# Patient Record
Sex: FEMALE | Race: BLACK OR AFRICAN AMERICAN | Employment: OTHER | URBAN - METROPOLITAN AREA
[De-identification: names, ages, dates, MRNs, and addresses within clinical notes are randomized per-mention and may not be internally consistent; named-entity substitution may affect disease eponyms.]

---

## 2017-01-24 ENCOUNTER — THERAPY VISIT (OUTPATIENT)
Dept: PHYSICAL THERAPY | Facility: CLINIC | Age: 32
End: 2017-01-24
Payer: COMMERCIAL

## 2017-01-24 DIAGNOSIS — M17.0 OSTEOARTHRITIS OF BOTH KNEES, UNSPECIFIED OSTEOARTHRITIS TYPE: ICD-10-CM

## 2017-01-24 DIAGNOSIS — M25.562 PAIN IN BOTH KNEES, UNSPECIFIED CHRONICITY: Primary | ICD-10-CM

## 2017-01-24 DIAGNOSIS — M25.561 PAIN IN BOTH KNEES, UNSPECIFIED CHRONICITY: Primary | ICD-10-CM

## 2017-01-24 PROCEDURE — 97161 PT EVAL LOW COMPLEX 20 MIN: CPT | Mod: GP | Performed by: PHYSICAL THERAPIST

## 2017-01-24 PROCEDURE — 97110 THERAPEUTIC EXERCISES: CPT | Mod: GP | Performed by: PHYSICAL THERAPIST

## 2017-01-24 NOTE — PROGRESS NOTES
Subjective:    HPI Comments: Hx of B foot pain and B knee pain associated w/ wt    Yamilex Mosher is a 31 year old female with a bilateral knees condition.  Occurance: started taking boxing classes and get in shape women.  Condition occurred: during recreation/sport.  This is a new condition  Pain began about 3 months ago.    Patient reports pain:  Anterior.    Pain is described as aching and is intermittent Pain Scale: 2-3/10 since injections, 8/10 before injection.     Symptoms are exacerbated by activity, descending stairs, ascending stairs, bending/squatting, kneeling, standing and transfers Relieved by: cortizone injections.  Since onset symptoms are gradually improving.  Special tests:  X-ray.      General health as reported by patient is fair.  Pertinent medical history includes:  Osteoarthritis.  Medical allergies: no.  Other surgeries include:  Other.  Current medications:  None as reported by the patient.  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  Patient is working in normal job without restrictions.  Primary job tasks include:  Prolonged sitting.    Barriers include:  Stairs.    Red flags:  None as reported by the patient.                      Objective:    System                                           Hip Evaluation    Hip Strength:    Flexion:   Left: 4+/5   Pain:  Right: 4+/5   Pain:                    Extension:  Left: 4-/5  Pain:Right: 4-/5    Pain:    Abduction:  Left: 4-/5     Pain:Right: 4-/5    Pain:  Adduction:  Left: 4-/5    Pain:Right: 4-/5   Pain:  Internal Rotation:  Left: 4/5    Pain:Right: 4/5   Pain:  External Rotation:  Left: 4-/5   Pain:  Right: 4-/5   Pain:                     Knee Evaluation:  ROM:  AROM: normal    AROM    Hyperextension: Left:  4    Right:  Extension: Left:    Right:  4  Flexion: Left: 120    Right: 110        Strength:     Extension:  Left: 4/5   Pain:      Right: 4/5   Pain:  Flexion:  Left: 4-/5   Pain:      Right: 4-/5   Pain:     Quad Set Left: Fair    Pain:   Quad Set Right: Fair    Pain:  Ligament Testing:  Normal                              General     ROS    Assessment/Plan:      Patient is a 31 year old female with both sides knee complaints.    Patient has the following significant findings with corresponding treatment plan.                Diagnosis 1:  B knee OA  Pain -  manual therapy, education, directional preference exercise and home program  Decreased ROM/flexibility - manual therapy, therapeutic exercise and home program  Decreased strength - therapeutic exercise, therapeutic activities and home program  Impaired muscle performance - neuro re-education and home program  Decreased function - therapeutic activities and home program    Therapy Evaluation Codes:   1) History comprised of:   Personal factors that impact the plan of care:      None.    Comorbidity factors that impact the plan of care are:      Osteoarthritis and Overweight.     Medications impacting care: Pain.  2) Examination of Body Systems comprised of:   Body structures and functions that impact the plan of care:      Knee.   Activity limitations that impact the plan of care are:      Bathing, Bending, Driving, Dressing, Jumping, Lifting, Running, Sitting, Sports, Squatting/kneeling, Stairs, Standing and Walking.  3) Clinical presentation characteristics are:   Evolving/Changing.  4) Decision-Making    Low complexity using standardized patient assessment instrument and/or measureable assessment of functional outcome.  Cumulative Therapy Evaluation is: Low complexity.    Previous and current functional limitations:  (See Goal Flow Sheet for this information)    Short term and Long term goals: (See Goal Flow Sheet for this information)     Communication ability:  Patient appears to be able to clearly communicate and understand verbal and written communication and follow directions correctly.  Treatment Explanation - The following has been discussed with the  patient:   RX ordered/plan of care  Anticipated outcomes  Possible risks and side effects  This patient would benefit from PT intervention to resume normal activities.   Rehab potential is fair.    Frequency:  1 X week, once daily  Duration:  for 3-4 weeks tapering to 2 X a month over 4-6 weeks  Discharge Plan:  Achieve all LTG.  Independent in home treatment program.  Reach maximal therapeutic benefit.    Please refer to the daily flowsheet for treatment today, total treatment time and time spent performing 1:1 timed codes.

## 2017-01-24 NOTE — Clinical Note
[facility]  [address] [place] [address]  [phone]    2017    Re: Yamilex Mosher   :   1985  MRN:  [id number]   REFERRING PHYSICIAN:   Robyn Choudhary  [facility]  Date of Initial Evaluation:  2017  Visits:  Rxs Used: 1  Reason for Referral:   Pain in both knees, unspecified chronicity  Osteoarthritis of both knees, unspecified osteoarthritis type    EVALUATION SUMMARY    Subjective:  HPI Comments: Hx of B foot pain and B knee pain associated w/ wt  Yamilex Mosher is a 31 year old female with a bilateral knees condition.  Occurance: started taking boxing classes and get in shape women.  Condition occurred: during recreation/sport.  This is a new condition  Pain began about 3 months ago.    Patient reports pain:  Anterior.    Pain is described as aching and is intermittent Pain Scale: 2-3/10 since injections, 8/10 before injection.     Symptoms are exacerbated by activity, descending stairs, ascending stairs, bending/squatting, kneeling, standing and transfers Relieved by: cortizone injections.  Since onset symptoms are gradually improving.  Special tests:  X-ray.      General health as reported by patient is fair.  Pertinent medical history includes:  Osteoarthritis.  Medical allergies: no.  Other surgeries include:  Other.  Current medications:  None as reported by the patient.  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  Patient is working in normal job without restrictions.  Primary job tasks include:  Prolonged sitting.  Barriers include:  Stairs.  Red flags:  None as reported by the patient.    Objective:  Hip Evaluation  Hip Strength:    Flexion:   Left: 4+/5   Pain:  Right: 4+/5   Pain:   Extension:  Left: 4-/5  Pain:Right: 4-/5    Pain:    Abduction:  Left: 4-/5     Pain:Right: 4-/5    Pain:  Adduction:  Left: 4-/5    Pain:Right: 4-/5   Pain:  Internal Rotation:   Left: 4/5    Pain:Right: 4/5   Pain:  External Rotation:  Left: 4-/5   Pain:  Right: 4-/5   Pain:  Knee Evaluation:  ROM:  AROM: normal    AROM  Hyperextension: Left:  4    Right:  Extension: Left:    Right:  4  Flexion: Left: 120    Right: 110  Strength:   Extension:  Left: 4/5   Pain:      Right: 4/5   Pain:  Flexion:  Left: 4-/5   Pain:      Right: 4-/5   Pain:    Quad Set Left: Fair    Pain:   Quad Set Right: Fair    Pain:  Ligament Testing:  Normal    Re: Yamilex Mosher :   1985    Assessment/Plan:    Patient is a 31 year old female with both sides knee complaints.    Patient has the following significant findings with corresponding treatment plan.                Diagnosis 1:  B knee OA  Pain -  manual therapy, education, directional preference exercise and home program  Decreased ROM/flexibility - manual therapy, therapeutic exercise and home program  Decreased strength - therapeutic exercise, therapeutic activities and home program  Impaired muscle performance - neuro re-education and home program  Decreased function - therapeutic activities and home program  Therapy Evaluation Codes:   1) History comprised of:   Personal factors that impact the plan of care:      None.    Comorbidity factors that impact the plan of care are:      Osteoarthritis and Overweight.     Medications impacting care: Pain.  2) Examination of Body Systems comprised of:   Body structures and functions that impact the plan of care:      Knee.   Activity limitations that impact the plan of care are:      Bathing, Bending, Driving, Dressing, Jumping, Lifting, Running, Sitting, Sports, Squatting/kneeling, Stairs, Standing and Walking.  3) Clinical presentation characteristics are:   Evolving/Changing.  4) Decision-Making    Low complexity using standardized patient assessment instrument and/or measureable assessment of functional outcome.  Cumulative Therapy Evaluation is: Low complexity.  Previous and current functional  limitations:  (See Goal Flow Sheet for this information)    Short term and Long term goals: (See Goal Flow Sheet for this information)   Communication ability:  Patient appears to be able to clearly communicate and understand verbal and written communication and follow directions correctly.  Treatment Explanation - The following has been discussed with the patient:   RX ordered/plan of care  Anticipated outcomes  Possible risks and side effects  This patient would benefit from PT intervention to resume normal activities.   Rehab potential is fair.  Frequency:  1 X week, once daily  Duration:  for 3-4 weeks tapering to 2 X a month over 4-6 weeks  Discharge Plan:  Achieve all LTG.  Independent in home treatment program.  Reach maximal therapeutic benefit.    Thank you for your referral.    INQUIRIES  Therapist: Radha Amin PT   [facility]  [address] [place] [address]  Phone: [phone]  Fax: [phone]

## 2017-02-08 ASSESSMENT — ACTIVITIES OF DAILY LIVING (ADL)
KNEEL ON THE FRONT OF YOUR KNEE: ACTIVITY IS NOT DIFFICULT
WEAKNESS: THE SYMPTOM PREVENTS ME FROM ALL DAILY ACTIVITIES
SWELLING: THE SYMPTOM PREVENTS ME FROM ALL DAILY ACTIVITIES
RISE FROM A CHAIR: ACTIVITY IS NOT DIFFICULT
GIVING WAY, BUCKLING OR SHIFTING OF KNEE: THE SYMPTOM PREVENTS ME FROM ALL DAILY ACTIVITIES
GO UP STAIRS: ACTIVITY IS NOT DIFFICULT
SIT WITH YOUR KNEE BENT: ACTIVITY IS VERY DIFFICULT
KNEE_ACTIVITY_OF_DAILY_LIVING_SUM: 31
KNEE_ACTIVITY_OF_DAILY_LIVING_SCORE: 47.69
STIFFNESS: THE SYMPTOM PREVENTS ME FROM ALL DAILY ACTIVITIES
RAW_SCORE: 33.38
PAIN: THE SYMPTOM PREVENTS ME FROM ALL DAILY ACTIVITIES
LIMPING: THE SYMPTOM PREVENTS ME FROM ALL DAILY ACTIVITIES
WALK: NOT ANSWERED
SQUAT: ACTIVITY IS NOT DIFFICULT
STAND: ACTIVITY IS NOT DIFFICULT
GO DOWN STAIRS: ACTIVITY IS NOT DIFFICULT

## 2017-06-14 ENCOUNTER — THERAPY VISIT (OUTPATIENT)
Dept: PHYSICAL THERAPY | Facility: CLINIC | Age: 32
End: 2017-06-14
Payer: COMMERCIAL

## 2017-06-14 DIAGNOSIS — M25.562 PAIN IN BOTH KNEES, UNSPECIFIED CHRONICITY: ICD-10-CM

## 2017-06-14 DIAGNOSIS — M25.561 PAIN IN BOTH KNEES, UNSPECIFIED CHRONICITY: ICD-10-CM

## 2017-06-14 PROCEDURE — 97110 THERAPEUTIC EXERCISES: CPT | Mod: GP | Performed by: PHYSICAL THERAPIST

## 2017-06-14 PROCEDURE — 97112 NEUROMUSCULAR REEDUCATION: CPT | Mod: GP | Performed by: PHYSICAL THERAPIST

## 2017-06-14 ASSESSMENT — ACTIVITIES OF DAILY LIVING (ADL)
KNEEL ON THE FRONT OF YOUR KNEE: ACTIVITY IS VERY DIFFICULT
GO UP STAIRS: ACTIVITY IS VERY DIFFICULT
RISE FROM A CHAIR: ACTIVITY IS VERY DIFFICULT
AS_A_RESULT_OF_YOUR_KNEE_INJURY,_HOW_WOULD_YOU_RATE_YOUR_CURRENT_LEVEL_OF_DAILY_ACTIVITY?: ABNORMAL
GO DOWN STAIRS: ACTIVITY IS VERY DIFFICULT
KNEE_ACTIVITY_OF_DAILY_LIVING_SCORE: 37.14
LIMPING: I DO NOT HAVE THE SYMPTOM
SWELLING: I DO NOT HAVE THE SYMPTOM
STIFFNESS: I DO NOT HAVE THE SYMPTOM
WEAKNESS: THE SYMPTOM AFFECTS MY ACTIVITY SEVERELY
HOW_WOULD_YOU_RATE_THE_OVERALL_FUNCTION_OF_YOUR_KNEE_DURING_YOUR_USUAL_DAILY_ACTIVITIES?: ABNORMAL
SQUAT: ACTIVITY IS VERY DIFFICULT
GIVING WAY, BUCKLING OR SHIFTING OF KNEE: THE SYMPTOM AFFECTS MY ACTIVITY SEVERELY
WALK: ACTIVITY IS VERY DIFFICULT
STAND: ACTIVITY IS VERY DIFFICULT
KNEE_ACTIVITY_OF_DAILY_LIVING_SUM: 26
HOW_WOULD_YOU_RATE_THE_CURRENT_FUNCTION_OF_YOUR_KNEE_DURING_YOUR_USUAL_DAILY_ACTIVITIES_ON_A_SCALE_FROM_0_TO_100_WITH_100_BEING_YOUR_LEVEL_OF_KNEE_FUNCTION_PRIOR_TO_YOUR_INJURY_AND_0_BEING_THE_INABILITY_TO_PERFORM_ANY_OF_YOUR_USUAL_DAILY_ACTIVITIES?: 80
RAW_SCORE: 26
PAIN: THE SYMPTOM AFFECTS MY ACTIVITY SEVERELY
SIT WITH YOUR KNEE BENT: ACTIVITY IS VERY DIFFICULT

## 2017-06-14 NOTE — LETTER
[facility]  [address]  [place] [address]  [phone]    Kyleigh 15, 2017    Re: Yamilex Mosher   :   1985  MRN:  [id number]   REFERRING PHYSICIAN:   Robyn Choudhary  [facility]  Date of Initial Evaluation:  2017  Visits:  Rxs Used: 2  Reason for Referral:  Pain in both knees, unspecified chronicity    PROGRESS  REPORT  Progress reporting period is from 2017 to 2017.       SUBJECTIVE  Subjective changes noted by patient: Pt was last seen in PT on 2017 after getting injections in her knee.  She reports that she hasn't done any of the exercises she was shown in PT.   Then about 3 months after her first injection the effect wore off.  She just got a second set of injections so is needing to do PT again now.   She doesn't have pain now since getting the injection.  She has a kick-boxing competition coming up so she really want to get her knees in shape.    Current pain level is 0/10  .     Previous pain level was  3/10  .   Changes in function:  None  Adverse reaction to treatment or activity: activity - stairclimbing    OBJECTIVE  Changes noted in objective findings:  Yes, see below.  Objective: MMT B hip flex 5/5, B knee ext 5/5, B knee flex 5/5, B hip IR 4-/5, B hip ER 3+/5, B hip abd 4-/5, B hip ext 4/5.  Poor VMO contraction, B LE.    L knee AROM 15-0-135.  R knee AROM 10-0-128     ASSESSMENT/PLAN  Updated problem list and treatment plan: Diagnosis 1:  B knee pain/weakness  Pain -  hot/cold therapy  Decreased strength - therapeutic exercise, therapeutic activities and home program  Impaired muscle performance - neuro re-education and home program  Decreased function - therapeutic activities and home program  STG/LTGs have been met or progress has been made towards goals:  None  Assessment of Progress: Pt has not done any knee exercises since last seen in PT on 2017, so no change in hip  strength.  Self Management Plans:  Patient has been instructed in a home treatment program.  I have re-evaluated this patient and find that the nature, scope, duration and intensity of the therapy is appropriate for the medical condition of the patient.  Yamilex continues to require the following intervention to meet STG and LTG's:  PT    Recommendations:  This patient would benefit from continued therapy.     Frequency:  1 X week, once daily  Duration:  for 4 weeks      Re: Yamilex Mosher   :   1985            Thank you for your referral.    INQUIRIES  Therapist: Anita Louis DPT   [facility]  [address] [place] [address]  Phone: [phone]  Fax: [phone]

## 2017-06-14 NOTE — PROGRESS NOTES
Subjective:    HPI                    Objective:    System    Physical Exam    General     ROS    Assessment/Plan:      PROGRESS  REPORT    Progress reporting period is from 1/24/2017 to 6/14/2017.       SUBJECTIVE  Subjective changes noted by patient: Pt was last seen in PT on 1/24/2017 after getting injections in her knee.  She reports that she hasn't done any of the exercises she was shown in PT.   Then about 3 months after her first injection the effect wore off.  She just got a second set of injections so is needing to do PT again now.   She doesn't have pain now since getting the injection.  She has a kick-boxing competition coming up so she really want to get her knees in shape.    Current pain level is 0/10  .     Previous pain level was  3/10  .   Changes in function:  None  Adverse reaction to treatment or activity: activity - stairclimbing    OBJECTIVE  Changes noted in objective findings:  Yes, see below.  Objective: MMT B hip flex 5/5, B knee ext 5/5, B knee flex 5/5, B hip IR 4-/5, B hip ER 3+/5, B hip abd 4-/5, B hip ext 4/5.  Poor VMO contraction, B LE.    L knee AROM 15-0-135.  R knee AROM 10-0-128     ASSESSMENT/PLAN  Updated problem list and treatment plan: Diagnosis 1:  B knee pain/weakness  Pain -  hot/cold therapy  Decreased strength - therapeutic exercise, therapeutic activities and home program  Impaired muscle performance - neuro re-education and home program  Decreased function - therapeutic activities and home program  STG/LTGs have been met or progress has been made towards goals:  None  Assessment of Progress: Pt has not done any knee exercises since last seen in PT on 1/24/2017, so no change in hip strength.  Self Management Plans:  Patient has been instructed in a home treatment program.  I have re-evaluated this patient and find that the nature, scope, duration and intensity of the therapy is appropriate for the medical condition of the patient.  Yamilex continues to require the  following intervention to meet STG and LTG's:  PT    Recommendations:  This patient would benefit from continued therapy.     Frequency:  1 X week, once daily  Duration:  for 4 weeks        Please refer to the daily flowsheet for treatment today, total treatment time and time spent performing 1:1 timed codes.

## 2017-06-14 NOTE — MR AVS SNAPSHOT
After Visit Summary   6/14/2017    Yamilex Mosher    MRN: [id number]           Patient Information     Date Of Birth          1985        Visit Information        Provider Department      6/14/2017 4:00 PM Anita Louis PT [facility]        Today's Diagnoses     Pain in both knees, unspecified chronicity           Follow-ups after your visit        Your next 10 appointments already scheduled     Jun 21, 2017  4:00 PM CDT   [facility] Extremity with Anita Louis PT   [facility] ([facility] [place])    [address]  [place] [address]   [phone]            Jun 28, 2017  3:20 PM CDT   [facility] Extremity with Anita Louis PT   [facility] ([facility] [place])    [address]  [place] [address] [phone]              Who to contact     If you have questions or need follow up information about today's clinic visit or your schedule please contact [facility] directly at [phone].  Normal or non-critical lab and imaging results will be communicated to you by MyChart, letter or phone within 4 business days after the clinic has received the results. If you do not hear from us within 7 days, please contact the clinic through MyChart or phone. If you have a critical or abnormal lab result, we will notify you by phone as soon as possible.  Submit refill requests through [employer] or call your pharmacy and they will forward the refill request to us. Please allow 3 business days for your refill to be completed.          Additional Information About Your Visit        Care EveryWhere ID     This is your Care EveryWhere ID. This could be used by other organizations to access your [facility] medical records  [id number]         Blood Pressure from Last 3 Encounters:   08/17/16 122/57   04/05/14 132/79   02/15/14 (!) 86/45    Weight from Last 3 Encounters:    08/17/16 132.9 kg (292 lb 15.9 oz)   04/05/14 112.9 kg (249 lb)   02/15/14 122 kg (269 lb)              We Performed the Following     [facility] PROGRESS NOTES REPORT     NEUROMUSCULAR RE-EDUCATION     THERAPEUTIC EXERCISES        Primary Care Provider Office Phone # Fax #    Rosalinda Navarrete [phone]       [facility] [address]  [place] [address]        Thank you!     Thank you for choosing [facility]  for your care. Our goal is always to provide you with excellent care. Hearing back from our patients is one way we can continue to improve our services. Please take a few minutes to complete the written survey that you may receive in the mail after your visit with us. Thank you!             Your Updated Medication List - Protect others around you: Learn how to safely use, store and throw away your medicines at www.disposemymeds.org.          This list is accurate as of: 6/14/17  4:49 PM.  Always use your most recent med list.                   Brand Name Dispense Instructions for use    LEVOTHYROXINE SODIUM PO          prenatal multivitamin  plus iron 27-0.8 MG Tabs per tablet      Take 1 tablet by mouth daily       RANITIDINE HCL PO      Take 150 mg by mouth 2 times daily       TYLENOL PO      Take 500 mg by mouth once

## 2017-06-21 ENCOUNTER — THERAPY VISIT (OUTPATIENT)
Dept: PHYSICAL THERAPY | Facility: CLINIC | Age: 32
End: 2017-06-21
Payer: COMMERCIAL

## 2017-06-21 DIAGNOSIS — M25.561 PAIN IN BOTH KNEES, UNSPECIFIED CHRONICITY: ICD-10-CM

## 2017-06-21 DIAGNOSIS — M25.562 PAIN IN BOTH KNEES, UNSPECIFIED CHRONICITY: ICD-10-CM

## 2017-06-21 PROCEDURE — 97110 THERAPEUTIC EXERCISES: CPT | Mod: GP | Performed by: PHYSICAL THERAPIST

## 2017-06-21 PROCEDURE — 97112 NEUROMUSCULAR REEDUCATION: CPT | Mod: GP | Performed by: PHYSICAL THERAPIST

## 2019-05-08 ENCOUNTER — APPOINTMENT (OUTPATIENT)
Dept: GENERAL RADIOLOGY | Facility: CLINIC | Age: 34
End: 2019-05-08
Attending: EMERGENCY MEDICINE
Payer: MEDICAID

## 2019-05-08 ENCOUNTER — HOSPITAL ENCOUNTER (EMERGENCY)
Facility: CLINIC | Age: 34
Discharge: HOME | End: 2019-05-09
Attending: EMERGENCY MEDICINE | Admitting: EMERGENCY MEDICINE
Payer: MEDICAID

## 2019-05-08 VITALS
HEART RATE: 86 BPM | TEMPERATURE: 98.7 F | HEIGHT: 65 IN | RESPIRATION RATE: 18 BRPM | BODY MASS INDEX: 44.98 KG/M2 | SYSTOLIC BLOOD PRESSURE: 149 MMHG | DIASTOLIC BLOOD PRESSURE: 56 MMHG | WEIGHT: 270 LBS | OXYGEN SATURATION: 99 %

## 2019-05-08 DIAGNOSIS — R05.9 COUGH: ICD-10-CM

## 2019-05-08 DIAGNOSIS — R07.89 CHEST WALL PAIN: ICD-10-CM

## 2019-05-08 LAB
B-HCG FREE SERPL-ACNC: <5 IU/L
TROPONIN I SERPL-MCNC: <0.015 UG/L (ref 0–0.04)

## 2019-05-08 PROCEDURE — 84484 ASSAY OF TROPONIN QUANT: CPT | Performed by: EMERGENCY MEDICINE

## 2019-05-08 PROCEDURE — 93005 ELECTROCARDIOGRAM TRACING: CPT

## 2019-05-08 PROCEDURE — 36415 COLL VENOUS BLD VENIPUNCTURE: CPT | Performed by: EMERGENCY MEDICINE

## 2019-05-08 PROCEDURE — 84702 CHORIONIC GONADOTROPIN TEST: CPT

## 2019-05-08 PROCEDURE — 71046 X-RAY EXAM CHEST 2 VIEWS: CPT

## 2019-05-08 PROCEDURE — 99285 EMERGENCY DEPT VISIT HI MDM: CPT | Mod: 25

## 2019-05-08 RX ORDER — BENZONATATE 100 MG/1
100 CAPSULE ORAL 3 TIMES DAILY PRN
Qty: 12 CAPSULE | Refills: 0 | Status: ON HOLD | OUTPATIENT
Start: 2019-05-08 | End: 2020-05-09

## 2019-05-08 ASSESSMENT — ENCOUNTER SYMPTOMS
SHORTNESS OF BREATH: 0
MYALGIAS: 1
VOMITING: 1
DIARRHEA: 0
COUGH: 1

## 2019-05-08 ASSESSMENT — MIFFLIN-ST. JEOR: SCORE: 1920.59

## 2019-05-08 NOTE — LETTER
May 9, 2019      To Whom It May Concern:      Yamilex Mosher was seen in our Emergency Department today, 05/09/19.  I expect her condition to improve over the next day.  She may return to work/school when improved, by 5/10/19.    Sincerely,        Rhiannon Lynne, RN, BSN

## 2019-05-08 NOTE — ED AVS SNAPSHOT
[facility] Emergency [facility]  [address]  [facility] [address]  Phone:  [phone]  Fax:  [phone]                                    Yamilex Mosher   MRN: [id number]    Department:  [facility] Emergency Department   Date of Visit:  5/8/2019           After Visit Summary Signature Page    I have received my discharge instructions, and my questions have been answered. I have discussed any challenges I see with this plan with the nurse or doctor.    ..........................................................................................................................................  Patient/Patient Representative Signature      ..........................................................................................................................................  Patient Representative Print Name and Relationship to Patient    ..................................................               ................................................  Date                                   Time    ..........................................................................................................................................  Reviewed by Signature/Title    ...................................................              ..............................................  Date                                               Time          22EPIC Rev 08/18

## 2019-05-09 LAB — INTERPRETATION ECG - MUSE: NORMAL

## 2019-05-09 NOTE — ED NOTES
Pt c/o cold since Sat, last few days experiencing persistent cough and epigastric chest pain, which worsens with cough.  ABCs in-tact. VSS.

## 2019-05-09 NOTE — ED PROVIDER NOTES
History     Chief Complaint:  Cough    HPI   Yamilex Mosher is a 34 year old female, with a history of thyroid disease, who is a smoker, who presents with her boyfriend to the emergency department for evaluation of central chest pain in the setting of a cough. The patient reports she started experiencing a cough and congestion four days prior to evaluation that has been progressively worsening. She reports she started feeling dizziness and center chest pain for two days prior to evaluation. She notes pain around her ribcage and her chest pain worsens with coughing. She reports vomiting following coughing episodes. She has not taken her temperature so is unsure wether she has had a fever or not. She denies any shortness of breath, palpitations, leg pain or swelling, or diarrhea. She denies any history of asthma, PE or other lung issues. She notes her son has been sick with similar symptoms.    Allergies:  No known drug allergies     Medications:    Levothyroxine    Past Medical History:    Morbid obesity  Migraine  Achilles bursitis  Plantar fasciitis  Incomplete   Thyroid disease    Past Surgical History:     section  Tooth extraction    Family History:    History reviewed. No pertinent family history.     Social History:  Smoking status: Current smoker  Alcohol use: Yes  The patient presents to the emergency department with her significant other.  Marital Status:   [4]     Review of Systems   HENT: Positive for congestion.    Respiratory: Positive for cough. Negative for shortness of breath.    Cardiovascular: Positive for chest pain. Negative for leg swelling.   Gastrointestinal: Positive for vomiting. Negative for diarrhea.   Musculoskeletal: Positive for myalgias.   All other systems reviewed and are negative.      Physical Exam   Patient Vitals for the past 24 hrs:   BP Temp Temp src Pulse Heart Rate Resp SpO2 Height Weight   19 2330 149/56 -- -- 86 -- 18 99 % -- --   19  "2315 (!) 151/100 -- -- 92 -- 16 97 % -- --   05/08/19 2234 (!) 140/93 98.7  F (37.1  C) Oral -- 93 18 98 % 1.651 m (5' 5\") 122.5 kg (270 lb)   05/08/19 2230 (!) 140/93 -- -- 95 -- 16 98 % -- --     Physical Exam  General:              Well-nourished              Speaking in full sentences              Intermittent spastic cough  Eyes:              Conjunctiva without injection or scleral icterus              PERRL              EOM full w/out entrapment or proptosis  ENT:              Moist mucous membranes              Posterior oropharynx clear without erythema or exudate              No tonsillar hypertrophy, exudate, asymmetry, nor uvular deviation              No oral lesions              Bilateral TM translucent and gray without air/fluid level or overlying erythema, bony landmarks visualized.              Nares patent              Pinnae normal              No midface swelling, erythema, or asymmetry  Neck:              Full ROM              No stiffness appreciated  Resp:              Lungs CTAB              No crackles, wheezing or audible rubs              Good air movement  CV:                    Normal rate, regular rhythm              S1 and S2 present              No murmur, gallop or rub  GI:              BS present              Abdomen soft without distention              Non-tender to light and deep palpation              No guarding or rebound tenderness  Skin:              Warm, dry, well perfused              No rashes or open wounds on exposed skin  MSK:              Moves all extremities              No focal deformities or swelling  Neuro:              Alert              Answers questions appropriately              Moves all extremities equally              Gait stable  Psych:              Normal affect, normal mood    Emergency Department Course   ECG (22:45:18):  Rate 83 bpm. MT interval 134. QRS duration 94. QT/QTc 374/439. P-R-T axes 49 41 13. Normal sinus rhythm with sinus arrhythmia. " Nonspecific T wave abnormality. Abnormal ECG. Interpreted at 2247 by Sky Celestin MD.    Imaging:  Radiographic findings were communicated with the patient and family who voiced understanding of the findings.    Chest XR, PA & LAT  IMPRESSION: No acute abnormality.  As read by Radiology.    Laboratory:  ISTAT HCG: <5  Troponin I (2317): <0.015    Emergency Department Course:  Past medical records, nursing notes, and vitals reviewed.  2231: I performed an exam of the patient and obtained history, as documented above.      The patient was sent for a chest x-ray while in the emergency department, findings above.     2356: I rechecked the patient. Explained findings to the patient and her significant other.    Findings and plan explained to the Patient and significant other. Patient discharged home with instructions regarding supportive care, medications, and reasons to return. The importance of close follow-up was reviewed.   Impression & Plan    Medical Decision Making:  Yamilex Mosher is a 34-year-old female presenting to the ER accompanied by boyfriend for evaluation of a cough.  VS on presentation reveal elevated BP though absence of hypoxia, tachycardia and or fever.  Symptoms at present are most consistent with upper respiratory infection.  She acknowledges congestion, as well as an intermittently productive cough.  No known sick contacts include her son, ill with similar URI symptoms.  She expresses concern regarding chest pain which is most likely musculoskeletal in the setting of recurrent coughing.  This is reproducible with palpation over the anterior and lateral chest wall.  X-ray negative for pneumonia, pneumothorax, or other acute pathology.  Referred cardiac process considered though felt to be unlikely.  EKG demonstrates sinus rhythm without findings of acute ischemia.  History and work-up not consistent with pericarditis nor myocarditis. I-STAT troponin unremarkable.  Additionally given the  above symptom complex, doubt PE.  Results and clinical impression reviewed with the patient.  At this time I feel she is stable for discharge home.  No indication for influenza testing is treatment at this time given the duration of her symptoms is otherwise supportive.  I recommended rest, fluids, Tylenol/ibuprofen for chest discomfort, Tessalon for cough suppression, and recommended smoking cessation.  Follow-up with primary care provider in 3 days if symptoms persist or worsen.  She is welcome to return to the ER with severe pain, shortness of breath or any other new or troubling symptoms.  All questions were answered prior to discharge.    Diagnosis:    ICD-10-CM   1. Cough R05   2. Chest wall pain R07.89     Disposition:  Discharged to home with instructions for follow up.  Discharge Medications:  Started    benzonatate 100 MG capsule  Commonly known as:  TESSALON  100 mg, Oral, 3 TIMES DAILY PRN     Zaira Conley  5/8/2019   [facility] EMERGENCY DEPARTMENT  Scribe Disclosure:  I, Zaira Conley, am serving as a scribe at 10:31 PM on 5/8/2019 to document services personally performed by Sky Celestin MD based on my observations and the provider's statements to me.      Sky Celestin MD  05/09/19 0025       Sky Celestin MD  05/09/19 0026

## 2019-05-09 NOTE — DISCHARGE INSTRUCTIONS

## 2020-05-05 ENCOUNTER — HOSPITAL ENCOUNTER (EMERGENCY)
Facility: CLINIC | Age: 35
Discharge: HOME | End: 2020-05-05
Attending: PHYSICIAN ASSISTANT | Admitting: PHYSICIAN ASSISTANT
Payer: COMMERCIAL

## 2020-05-05 ENCOUNTER — APPOINTMENT (OUTPATIENT)
Dept: GENERAL RADIOLOGY | Facility: CLINIC | Age: 35
End: 2020-05-05
Attending: PHYSICIAN ASSISTANT
Payer: COMMERCIAL

## 2020-05-05 VITALS
DIASTOLIC BLOOD PRESSURE: 111 MMHG | HEART RATE: 89 BPM | TEMPERATURE: 98 F | SYSTOLIC BLOOD PRESSURE: 141 MMHG | OXYGEN SATURATION: 96 % | WEIGHT: 293 LBS | RESPIRATION RATE: 14 BRPM | BODY MASS INDEX: 48.82 KG/M2 | HEIGHT: 65 IN

## 2020-05-05 DIAGNOSIS — R07.9 ACUTE CHEST PAIN: ICD-10-CM

## 2020-05-05 LAB
ANION GAP SERPL CALCULATED.3IONS-SCNC: 4 MMOL/L (ref 3–14)
B-HCG FREE SERPL-ACNC: <5 IU/L
BASOPHILS # BLD AUTO: 0 10E9/L (ref 0–0.2)
BASOPHILS NFR BLD AUTO: 0.5 %
BUN SERPL-MCNC: 14 MG/DL (ref 7–30)
CALCIUM SERPL-MCNC: 8.4 MG/DL (ref 8.5–10.1)
CHLORIDE SERPL-SCNC: 102 MMOL/L (ref 94–109)
CO2 SERPL-SCNC: 31 MMOL/L (ref 20–32)
CREAT SERPL-MCNC: 0.69 MG/DL (ref 0.52–1.04)
D DIMER PPP FEU-MCNC: 0.3 UG/ML FEU (ref 0–0.5)
DIFFERENTIAL METHOD BLD: NORMAL
EOSINOPHIL # BLD AUTO: 0.1 10E9/L (ref 0–0.7)
EOSINOPHIL NFR BLD AUTO: 1.2 %
ERYTHROCYTE [DISTWIDTH] IN BLOOD BY AUTOMATED COUNT: 13.1 % (ref 10–15)
GFR SERPL CREATININE-BSD FRML MDRD: >90 ML/MIN/{1.73_M2}
GLUCOSE SERPL-MCNC: 109 MG/DL (ref 70–99)
HCT VFR BLD AUTO: 41 % (ref 35–47)
HGB BLD-MCNC: 13.5 G/DL (ref 11.7–15.7)
IMM GRANULOCYTES # BLD: 0 10E9/L (ref 0–0.4)
IMM GRANULOCYTES NFR BLD: 0.2 %
LYMPHOCYTES # BLD AUTO: 3 10E9/L (ref 0.8–5.3)
LYMPHOCYTES NFR BLD AUTO: 36.8 %
MCH RBC QN AUTO: 28.8 PG (ref 26.5–33)
MCHC RBC AUTO-ENTMCNC: 32.9 G/DL (ref 31.5–36.5)
MCV RBC AUTO: 88 FL (ref 78–100)
MONOCYTES # BLD AUTO: 0.4 10E9/L (ref 0–1.3)
MONOCYTES NFR BLD AUTO: 4.5 %
NEUTROPHILS # BLD AUTO: 4.6 10E9/L (ref 1.6–8.3)
NEUTROPHILS NFR BLD AUTO: 56.8 %
NRBC # BLD AUTO: 0 10*3/UL
NRBC BLD AUTO-RTO: 0 /100
PLATELET # BLD AUTO: 259 10E9/L (ref 150–450)
POTASSIUM SERPL-SCNC: 3.4 MMOL/L (ref 3.4–5.3)
RBC # BLD AUTO: 4.68 10E12/L (ref 3.8–5.2)
SODIUM SERPL-SCNC: 137 MMOL/L (ref 133–144)
TROPONIN I SERPL-MCNC: <0.015 UG/L (ref 0–0.04)
WBC # BLD AUTO: 8.2 10E9/L (ref 4–11)

## 2020-05-05 PROCEDURE — 85379 FIBRIN DEGRADATION QUANT: CPT | Performed by: PHYSICIAN ASSISTANT

## 2020-05-05 PROCEDURE — 25000132 ZZH RX MED GY IP 250 OP 250 PS 637: Performed by: PHYSICIAN ASSISTANT

## 2020-05-05 PROCEDURE — 85025 COMPLETE CBC W/AUTO DIFF WBC: CPT | Performed by: PHYSICIAN ASSISTANT

## 2020-05-05 PROCEDURE — 80048 BASIC METABOLIC PNL TOTAL CA: CPT | Performed by: PHYSICIAN ASSISTANT

## 2020-05-05 PROCEDURE — 84702 CHORIONIC GONADOTROPIN TEST: CPT

## 2020-05-05 PROCEDURE — 99285 EMERGENCY DEPT VISIT HI MDM: CPT | Mod: 25

## 2020-05-05 PROCEDURE — 84484 ASSAY OF TROPONIN QUANT: CPT | Performed by: PHYSICIAN ASSISTANT

## 2020-05-05 PROCEDURE — 25000125 ZZHC RX 250: Performed by: PHYSICIAN ASSISTANT

## 2020-05-05 PROCEDURE — 71045 X-RAY EXAM CHEST 1 VIEW: CPT

## 2020-05-05 PROCEDURE — 93005 ELECTROCARDIOGRAM TRACING: CPT

## 2020-05-05 RX ADMIN — LIDOCAINE HYDROCHLORIDE 30 ML: 20 SOLUTION ORAL; TOPICAL at 17:39

## 2020-05-05 ASSESSMENT — ENCOUNTER SYMPTOMS
SHORTNESS OF BREATH: 1
COUGH: 1
BACK PAIN: 1
FEVER: 0

## 2020-05-05 ASSESSMENT — MIFFLIN-ST. JEOR: SCORE: 2028.99

## 2020-05-05 NOTE — ED PROVIDER NOTES
History     Chief Complaint:  Chest Pain and Back Pain    HPI  Yamilex Mosher is a 35 year old female with a history of thyroid disease who presents to the emergency department for evaluation of chest and back pain. Over the past few months the patient has been experiencing an intermittent squeezing tight pain beneath her breasts that moves into her back. Usually she is able to take a Tylenol and massage her back which helped the pain go away by the next morning. This last happened 3 weeks ago and then today at 0300. This pain kept the patient up and she was not able to go to sleep again before work this morning. The patient's other complaint is a cough which she has been experiencing for the last few weeks as well as shortness of breath. She last took a Tylenol this morning for her chest pain.     Cardiac/PE/DVT Risk Factors:  History of hypertension - P  History of hyperlipidemia - N  History of diabetes - N  History of smoking - N  Personal history of PE/DVT - N  Family history of PE/DVT - N  Family history of heart complications - N  Recent travel - N  Recent surgery - N  Other immobilizations - N  Cancer - N    Allergies:  No Known Drug Allergies     Medications:    Levothyroxine   Prinzide     Past Medical History:    Thyroid disease   Pelvic pain in female   Generalized headaches  Varicella    Past Surgical History:    GYN surgery  Vaginal delivery  [place] teeth extraction     Family History:    Mother - hyperlipidemia      Social History:  The patient arrives alone  Smoking Status: Current some day smoker   Smokeless Tobacco: Never  Alcohol Use: Yes    Marital Status:        Review of Systems   Constitutional: Negative for fever.   Respiratory: Positive for cough and shortness of breath.    Cardiovascular: Positive for chest pain.   Musculoskeletal: Positive for back pain.   All other systems reviewed and are negative.    Physical Exam     Patient Vitals for the past 24 hrs:   BP Temp Temp src Pulse  "Heart Rate Resp SpO2 Height Weight   05/05/20 1915 (!) 141/111 -- -- 89 85 -- 96 % -- --   05/05/20 1900 (!) 134/110 -- -- 84 86 -- 97 % -- --   05/05/20 1845 129/89 -- -- 85 85 14 98 % -- --   05/05/20 1830 (!) 148/91 -- -- 84 87 17 97 % -- --   05/05/20 1815 (!) 168/104 -- -- 80 83 21 99 % -- --   05/05/20 1800 (!) 150/89 -- -- 80 82 11 99 % -- --   05/05/20 1745 (!) 160/103 -- -- 80 81 17 97 % -- --   05/05/20 1730 -- -- -- 79 73 (!) 32 97 % -- --   05/05/20 1715 -- -- -- 79 82 19 -- -- --   05/05/20 1700 -- -- -- 80 83 15 98 % -- --   05/05/20 1533 (!) 149/100 98  F (36.7  C) Oral -- 91 18 99 % 1.651 m (5' 5\") 133.8 kg (295 lb)     Physical Exam  General: Alert and interactive. Appears well. Cooperative and pleasant.   Eyes: The pupils are equal and round. EOMs intact. No scleral icterus.  ENT: No abnormalities to the external nose or ears. Mucous membranes moist. Posterior oropharynx is non-erythematous.  Neck: Trachea is in the midline. No nuchal rigidity.     CV: Regular rate and rhythm. Mild anterior chest wall TTP. S1 and S2 normal without murmur, click, gallop or rub.   Resp: Breath sounds are clear bilaterally, without rhonchi, wheezes, rales. Non-labored, no retractions or accessory muscle use.     GI: Abdomen is soft without distension. No tenderness to palpation. No peritoneal signs.    MS: Moving all extremities well. Good muscle tone.   Skin: Warm and dry. No rash or lesions noted.  Neuro: Alert and oriented x 3. No focal neurologic deficits. Good strength and sensation in upper and lower extremities. Psych: Awake. Alert.  Normal affect. Appropriate interactions.  Lymph: No anterior or posterior cervical lymphadenopathy noted.    Emergency Department Course     ECG:  ECG taken at 1544, ECG read at 1547  Sinus rhythm with occasional Premature ventricular complexes  Otherwise normal ECG  When compared with ECG of 08-MAY-2019 22:45,  Premature ventricular complexes are now Present  Rate 93 bpm. MT " interval 128 ms. QRS duration 88 ms. QT/QTc 370/460 ms. P-R-T axes 54 37 17.    Imaging:  Radiology findings were communicated with the patient who voiced understanding of the findings.    XR Chest Port 1 view   IMPRESSION: The lungs appear clear although visualization limited by   patient body habitus. No pneumothorax or pleural effusion. Heart size   normal  Reading per radiology    Laboratory:  Laboratory findings were communicated with the patient who voiced understanding of the findings.    CBC:  o/w WNL. (WBC 8.2, HGB 13.5, )   BMP: Glucose 109 (H), calcium 8.4 (L), o/w WNL (Creatinine: 0.69)    D-dimer: 0.3  Troponin (Collected 1723): <0.015    HCG Quantitative: <5.0    Interventions:  1739 GI Cocktail - Maalox 15 mL, Viscous Lidocaine 15 mL, 30 mL suspension PO    Emergency Department Course:  Past medical records, nursing notes, and vitals reviewed.  1622: I performed an exam of the patient and obtained history, as documented above.     IV was inserted and blood was drawn for laboratory testing, results above.  The patient was sent for an XR while in the emergency department, findings above    1915: I rechecked the patient. Explained findings to patient.    I personally reviewed the laboratory and imaging results with the Patient and answered all related questions prior to discharge.     Findings and plan explained to the Patient. Patient discharged home with instructions regarding supportive care, medications, and reasons to return. The importance of close follow-up was reviewed.   Impression & Plan      Medical Decision Making:  Yamilex Mosher is a 35 year old female who presents for evaluation of chest pain. The workup in the Emergency Department is negative and reassuring. The differential is broad, including ACS, PE, cardiac tamponade, aortic dissection, pneumonia, pneumothorax, pericarditis, esophageal rupture, and others.     EKG shows NSR without signs of ischemia or infarction and troponin  negative, and thus I doubt ACS. Dimer is negative, essentially ruling out PE. Additionally, the patient has no signs of tachypnea, tachycardia, or hypoxia. No signs of leukocytosis, anemia, renal dysfunction, electrolyte abnormalities and chest x-ray shows no signs of pneumonia, pneumothorax, or pleural effusions.     The patient has a HEART score of 1, and I believe she is stable for outpatient follow up. Stress ECHO order will be held given age and lack of risk factors. May need to be ordered per primary care. She feels better here after GI cocktail, and I suspect this is either gastritis or musculoskeletal. Certainly, with cough, patient could have COVID-19. The patient does not currently meet criteria for hospitalization or testing. Patient given [facility] guidelines for home isolation and will follow up with PCP. Patient will drink plenty of fluids, take anti-pyretics and OTC decongestants. Return here for worsening chest pain, shortness of breath, vomiting, fevers, or other concerns.     Diagnosis:    ICD-10-CM   1. Acute chest pain  R07.9     Disposition:   discharged to home    Scribe Disclosure:  I, Miriam Simmons, am serving as a scribe at 4:22 PM on 5/5/2020 to document services personally performed by Barbara Staton PA based on my observations and the provider's statements to me.    [facility] EMERGENCY DEPARTMENT       Barbara Staton PA-C  05/05/20 2423

## 2020-05-05 NOTE — ED AVS SNAPSHOT
[facility] Emergency Department  [address]  [facility] [address]  Phone:  [phone]  Fax:  [phone]                                    Yamilex Mosher   MRN: [id number]    Department:  [facility] Emergency Department   Date of Visit:  5/5/2020           After Visit Summary Signature Page    I have received my discharge instructions, and my questions have been answered. I have discussed any challenges I see with this plan with the nurse or doctor.    ..........................................................................................................................................  Patient/Patient Representative Signature      ..........................................................................................................................................  Patient Representative Print Name and Relationship to Patient    ..................................................               ................................................  Date                                   Time    ..........................................................................................................................................  Reviewed by Signature/Title    ...................................................              ..............................................  Date                                               Time          22EPIC Rev 08/18

## 2020-05-05 NOTE — ED TRIAGE NOTES
Pt presents for evaluation of chest pain underneath her breasts that wraps around to her back. Pt also feels like her heart is tightening. Started around 0300 and hasn't gone away. Has also had cough, body aches, headache and diarrhea with the last week.

## 2020-05-06 LAB — INTERPRETATION ECG - MUSE: NORMAL

## 2020-05-09 ENCOUNTER — ANESTHESIA (OUTPATIENT)
Dept: SURGERY | Facility: CLINIC | Age: 35
End: 2020-05-09
Payer: COMMERCIAL

## 2020-05-09 ENCOUNTER — HOSPITAL ENCOUNTER (OUTPATIENT)
Facility: CLINIC | Age: 35
Setting detail: OBSERVATION
Discharge: HOME | End: 2020-05-09
Attending: EMERGENCY MEDICINE | Admitting: SURGERY
Payer: COMMERCIAL

## 2020-05-09 ENCOUNTER — APPOINTMENT (OUTPATIENT)
Dept: ULTRASOUND IMAGING | Facility: CLINIC | Age: 35
End: 2020-05-09
Attending: EMERGENCY MEDICINE
Payer: COMMERCIAL

## 2020-05-09 ENCOUNTER — APPOINTMENT (OUTPATIENT)
Dept: GENERAL RADIOLOGY | Facility: CLINIC | Age: 35
End: 2020-05-09
Attending: EMERGENCY MEDICINE
Payer: COMMERCIAL

## 2020-05-09 ENCOUNTER — ANESTHESIA EVENT (OUTPATIENT)
Dept: SURGERY | Facility: CLINIC | Age: 35
End: 2020-05-09
Payer: COMMERCIAL

## 2020-05-09 ENCOUNTER — APPOINTMENT (OUTPATIENT)
Dept: GENERAL RADIOLOGY | Facility: CLINIC | Age: 35
End: 2020-05-09
Attending: SURGERY
Payer: COMMERCIAL

## 2020-05-09 VITALS
DIASTOLIC BLOOD PRESSURE: 99 MMHG | TEMPERATURE: 98 F | HEIGHT: 65 IN | BODY MASS INDEX: 48.63 KG/M2 | WEIGHT: 291.9 LBS | HEART RATE: 99 BPM | OXYGEN SATURATION: 98 % | RESPIRATION RATE: 16 BRPM | SYSTOLIC BLOOD PRESSURE: 147 MMHG

## 2020-05-09 DIAGNOSIS — K80.00 CALCULUS OF GALLBLADDER WITH ACUTE CHOLECYSTITIS WITHOUT OBSTRUCTION: Primary | ICD-10-CM

## 2020-05-09 DIAGNOSIS — I10 HYPERTENSION, UNSPECIFIED TYPE: ICD-10-CM

## 2020-05-09 DIAGNOSIS — K80.20 SYMPTOMATIC CHOLELITHIASIS: ICD-10-CM

## 2020-05-09 LAB
ALBUMIN SERPL-MCNC: 3.6 G/DL (ref 3.4–5)
ALBUMIN UR-MCNC: NEGATIVE MG/DL
ALP SERPL-CCNC: 52 U/L (ref 40–150)
ALT SERPL W P-5'-P-CCNC: 33 U/L (ref 0–50)
ANION GAP SERPL CALCULATED.3IONS-SCNC: 5 MMOL/L (ref 3–14)
APPEARANCE UR: CLEAR
AST SERPL W P-5'-P-CCNC: 18 U/L (ref 0–45)
BACTERIA #/AREA URNS HPF: ABNORMAL /HPF
BASOPHILS # BLD AUTO: 0 10E9/L (ref 0–0.2)
BASOPHILS NFR BLD AUTO: 0.5 %
BILIRUB SERPL-MCNC: 0.3 MG/DL (ref 0.2–1.3)
BILIRUB UR QL STRIP: NEGATIVE
BUN SERPL-MCNC: 14 MG/DL (ref 7–30)
CALCIUM SERPL-MCNC: 8.8 MG/DL (ref 8.5–10.1)
CHLORIDE SERPL-SCNC: 103 MMOL/L (ref 94–109)
CO2 SERPL-SCNC: 28 MMOL/L (ref 20–32)
COLOR UR AUTO: ABNORMAL
CREAT SERPL-MCNC: 0.72 MG/DL (ref 0.52–1.04)
DIFFERENTIAL METHOD BLD: NORMAL
EOSINOPHIL # BLD AUTO: 0.1 10E9/L (ref 0–0.7)
EOSINOPHIL NFR BLD AUTO: 1.2 %
ERYTHROCYTE [DISTWIDTH] IN BLOOD BY AUTOMATED COUNT: 13.2 % (ref 10–15)
GFR SERPL CREATININE-BSD FRML MDRD: >90 ML/MIN/{1.73_M2}
GLUCOSE SERPL-MCNC: 119 MG/DL (ref 70–99)
GLUCOSE UR STRIP-MCNC: NEGATIVE MG/DL
HCT VFR BLD AUTO: 42.7 % (ref 35–47)
HGB BLD-MCNC: 13.9 G/DL (ref 11.7–15.7)
HGB UR QL STRIP: ABNORMAL
IMM GRANULOCYTES # BLD: 0 10E9/L (ref 0–0.4)
IMM GRANULOCYTES NFR BLD: 0.4 %
KETONES UR STRIP-MCNC: NEGATIVE MG/DL
LEUKOCYTE ESTERASE UR QL STRIP: NEGATIVE
LIPASE SERPL-CCNC: 95 U/L (ref 73–393)
LYMPHOCYTES # BLD AUTO: 2.6 10E9/L (ref 0.8–5.3)
LYMPHOCYTES NFR BLD AUTO: 31.9 %
MCH RBC QN AUTO: 29.3 PG (ref 26.5–33)
MCHC RBC AUTO-ENTMCNC: 32.6 G/DL (ref 31.5–36.5)
MCV RBC AUTO: 90 FL (ref 78–100)
MONOCYTES # BLD AUTO: 0.4 10E9/L (ref 0–1.3)
MONOCYTES NFR BLD AUTO: 4.6 %
MUCOUS THREADS #/AREA URNS LPF: PRESENT /LPF
NEUTROPHILS # BLD AUTO: 4.9 10E9/L (ref 1.6–8.3)
NEUTROPHILS NFR BLD AUTO: 61.4 %
NITRATE UR QL: NEGATIVE
NRBC # BLD AUTO: 0 10*3/UL
NRBC BLD AUTO-RTO: 0 /100
PH UR STRIP: 5.5 PH (ref 5–7)
PLATELET # BLD AUTO: 254 10E9/L (ref 150–450)
POTASSIUM SERPL-SCNC: 3.6 MMOL/L (ref 3.4–5.3)
PROT SERPL-MCNC: 7.2 G/DL (ref 6.8–8.8)
RBC # BLD AUTO: 4.74 10E12/L (ref 3.8–5.2)
RBC #/AREA URNS AUTO: 0 /HPF (ref 0–2)
SARS-COV-2 PCR COMMENT: NORMAL
SARS-COV-2 RNA SPEC QL NAA+PROBE: NEGATIVE
SARS-COV-2 RNA SPEC QL NAA+PROBE: NORMAL
SODIUM SERPL-SCNC: 136 MMOL/L (ref 133–144)
SOURCE: ABNORMAL
SP GR UR STRIP: 1.01 (ref 1–1.03)
SPECIMEN SOURCE: NORMAL
SPECIMEN SOURCE: NORMAL
SQUAMOUS #/AREA URNS AUTO: 1 /HPF (ref 0–1)
TROPONIN I SERPL-MCNC: <0.015 UG/L (ref 0–0.04)
UROBILINOGEN UR STRIP-MCNC: NORMAL MG/DL (ref 0–2)
WBC # BLD AUTO: 8 10E9/L (ref 4–11)
WBC #/AREA URNS AUTO: <1 /HPF (ref 0–5)

## 2020-05-09 PROCEDURE — 96376 TX/PRO/DX INJ SAME DRUG ADON: CPT | Mod: 59

## 2020-05-09 PROCEDURE — 37000009 ZZH ANESTHESIA TECHNICAL FEE, EACH ADDTL 15 MIN: Performed by: SURGERY

## 2020-05-09 PROCEDURE — 83690 ASSAY OF LIPASE: CPT | Performed by: EMERGENCY MEDICINE

## 2020-05-09 PROCEDURE — 36000060 ZZH SURGERY LEVEL 3 W FLUORO 1ST 30 MIN: Performed by: SURGERY

## 2020-05-09 PROCEDURE — 84484 ASSAY OF TROPONIN QUANT: CPT | Performed by: EMERGENCY MEDICINE

## 2020-05-09 PROCEDURE — 99207 ZZC APP CREDIT; MD BILLING SHARED VISIT: CPT | Performed by: PHYSICIAN ASSISTANT

## 2020-05-09 PROCEDURE — 40000277 XR SURGERY CARM FLUORO LESS THAN 5 MIN W STILLS

## 2020-05-09 PROCEDURE — 76705 ECHO EXAM OF ABDOMEN: CPT

## 2020-05-09 PROCEDURE — 99203 OFFICE O/P NEW LOW 30 MIN: CPT | Mod: 57 | Performed by: SURGERY

## 2020-05-09 PROCEDURE — 96374 THER/PROPH/DIAG INJ IV PUSH: CPT | Mod: 59

## 2020-05-09 PROCEDURE — 81001 URINALYSIS AUTO W/SCOPE: CPT | Performed by: EMERGENCY MEDICINE

## 2020-05-09 PROCEDURE — 47563 LAPARO CHOLECYSTECTOMY/GRAPH: CPT | Performed by: SURGERY

## 2020-05-09 PROCEDURE — 25800030 ZZH RX IP 258 OP 636: Performed by: HOSPITALIST

## 2020-05-09 PROCEDURE — G0378 HOSPITAL OBSERVATION PER HR: HCPCS

## 2020-05-09 PROCEDURE — 25000128 H RX IP 250 OP 636: Performed by: NURSE ANESTHETIST, CERTIFIED REGISTERED

## 2020-05-09 PROCEDURE — 99285 EMERGENCY DEPT VISIT HI MDM: CPT | Mod: 25

## 2020-05-09 PROCEDURE — 25000125 ZZHC RX 250: Performed by: SURGERY

## 2020-05-09 PROCEDURE — 25000128 H RX IP 250 OP 636: Performed by: SURGERY

## 2020-05-09 PROCEDURE — 96375 TX/PRO/DX INJ NEW DRUG ADDON: CPT | Mod: 59

## 2020-05-09 PROCEDURE — 71000027 ZZH RECOVERY PHASE 2 EACH 15 MINS: Performed by: SURGERY

## 2020-05-09 PROCEDURE — 71000013 ZZH RECOVERY PHASE 1 LEVEL 1 EA ADDTL HR: Performed by: SURGERY

## 2020-05-09 PROCEDURE — 25800025 ZZH RX 258: Performed by: SURGERY

## 2020-05-09 PROCEDURE — 88304 TISSUE EXAM BY PATHOLOGIST: CPT | Mod: 26 | Performed by: SURGERY

## 2020-05-09 PROCEDURE — 25000128 H RX IP 250 OP 636: Performed by: ANESTHESIOLOGY

## 2020-05-09 PROCEDURE — 85025 COMPLETE CBC W/AUTO DIFF WBC: CPT | Performed by: EMERGENCY MEDICINE

## 2020-05-09 PROCEDURE — 25000125 ZZHC RX 250: Performed by: NURSE ANESTHETIST, CERTIFIED REGISTERED

## 2020-05-09 PROCEDURE — 87635 SARS-COV-2 COVID-19 AMP PRB: CPT | Performed by: EMERGENCY MEDICINE

## 2020-05-09 PROCEDURE — 71000012 ZZH RECOVERY PHASE 1 LEVEL 1 FIRST HR: Performed by: SURGERY

## 2020-05-09 PROCEDURE — 93005 ELECTROCARDIOGRAM TRACING: CPT

## 2020-05-09 PROCEDURE — 25500064 ZZH RX 255 OP 636: Performed by: SURGERY

## 2020-05-09 PROCEDURE — 27210794 ZZH OR GENERAL SUPPLY STERILE: Performed by: SURGERY

## 2020-05-09 PROCEDURE — 96374 THER/PROPH/DIAG INJ IV PUSH: CPT

## 2020-05-09 PROCEDURE — 25000128 H RX IP 250 OP 636: Performed by: HOSPITALIST

## 2020-05-09 PROCEDURE — 99220 ZZC INITIAL OBSERVATION CARE,LEVL III: CPT | Performed by: HOSPITALIST

## 2020-05-09 PROCEDURE — 80053 COMPREHEN METABOLIC PANEL: CPT | Performed by: EMERGENCY MEDICINE

## 2020-05-09 PROCEDURE — 25000128 H RX IP 250 OP 636: Performed by: EMERGENCY MEDICINE

## 2020-05-09 PROCEDURE — 25800030 ZZH RX IP 258 OP 636: Performed by: NURSE ANESTHETIST, CERTIFIED REGISTERED

## 2020-05-09 PROCEDURE — 25000132 ZZH RX MED GY IP 250 OP 250 PS 637: Performed by: SURGERY

## 2020-05-09 PROCEDURE — 47563 LAPARO CHOLECYSTECTOMY/GRAPH: CPT | Mod: AS | Performed by: PHYSICIAN ASSISTANT

## 2020-05-09 PROCEDURE — 40000306 ZZH STATISTIC PRE PROC ASSESS II: Performed by: SURGERY

## 2020-05-09 PROCEDURE — 71046 X-RAY EXAM CHEST 2 VIEWS: CPT

## 2020-05-09 PROCEDURE — 36000058 ZZH SURGERY LEVEL 3 EA 15 ADDTL MIN: Performed by: SURGERY

## 2020-05-09 PROCEDURE — 37000008 ZZH ANESTHESIA TECHNICAL FEE, 1ST 30 MIN: Performed by: SURGERY

## 2020-05-09 PROCEDURE — 88304 TISSUE EXAM BY PATHOLOGIST: CPT | Performed by: SURGERY

## 2020-05-09 RX ORDER — GLYCOPYRROLATE 0.2 MG/ML
INJECTION, SOLUTION INTRAMUSCULAR; INTRAVENOUS PRN
Status: DISCONTINUED | OUTPATIENT
Start: 2020-05-09 | End: 2020-05-09

## 2020-05-09 RX ORDER — HYDROMORPHONE HYDROCHLORIDE 1 MG/ML
.3-.5 INJECTION, SOLUTION INTRAMUSCULAR; INTRAVENOUS; SUBCUTANEOUS EVERY 10 MIN PRN
Status: DISCONTINUED | OUTPATIENT
Start: 2020-05-09 | End: 2020-05-09 | Stop reason: HOSPADM

## 2020-05-09 RX ORDER — KETOROLAC TROMETHAMINE 30 MG/ML
30 INJECTION, SOLUTION INTRAMUSCULAR; INTRAVENOUS EVERY 6 HOURS PRN
Status: DISCONTINUED | OUTPATIENT
Start: 2020-05-09 | End: 2020-05-09 | Stop reason: HOSPADM

## 2020-05-09 RX ORDER — HYDROCODONE BITARTRATE AND ACETAMINOPHEN 5; 325 MG/1; MG/1
1-2 TABLET ORAL EVERY 4 HOURS PRN
Qty: 10 TABLET | Refills: 0 | Status: SHIPPED | OUTPATIENT
Start: 2020-05-09

## 2020-05-09 RX ORDER — PROPOFOL 10 MG/ML
INJECTION, EMULSION INTRAVENOUS PRN
Status: DISCONTINUED | OUTPATIENT
Start: 2020-05-09 | End: 2020-05-09

## 2020-05-09 RX ORDER — AMPICILLIN AND SULBACTAM 2; 1 G/1; G/1
3 INJECTION, POWDER, FOR SOLUTION INTRAMUSCULAR; INTRAVENOUS EVERY 6 HOURS
Status: DISCONTINUED | OUTPATIENT
Start: 2020-05-09 | End: 2020-05-09 | Stop reason: HOSPADM

## 2020-05-09 RX ORDER — NEOSTIGMINE METHYLSULFATE 1 MG/ML
VIAL (ML) INJECTION PRN
Status: DISCONTINUED | OUTPATIENT
Start: 2020-05-09 | End: 2020-05-09

## 2020-05-09 RX ORDER — OXYCODONE HYDROCHLORIDE 5 MG/1
5 TABLET ORAL EVERY 4 HOURS PRN
Status: DISCONTINUED | OUTPATIENT
Start: 2020-05-09 | End: 2020-05-09 | Stop reason: HOSPADM

## 2020-05-09 RX ORDER — HYDROMORPHONE HYDROCHLORIDE 1 MG/ML
.3-.5 INJECTION, SOLUTION INTRAMUSCULAR; INTRAVENOUS; SUBCUTANEOUS
Status: DISCONTINUED | OUTPATIENT
Start: 2020-05-09 | End: 2020-05-09 | Stop reason: HOSPADM

## 2020-05-09 RX ORDER — ONDANSETRON 4 MG/1
4 TABLET, ORALLY DISINTEGRATING ORAL EVERY 6 HOURS PRN
Status: DISCONTINUED | OUTPATIENT
Start: 2020-05-09 | End: 2020-05-09 | Stop reason: HOSPADM

## 2020-05-09 RX ORDER — LIDOCAINE 40 MG/G
CREAM TOPICAL
Status: DISCONTINUED | OUTPATIENT
Start: 2020-05-09 | End: 2020-05-09 | Stop reason: HOSPADM

## 2020-05-09 RX ORDER — ACETAMINOPHEN 500 MG
500-1000 TABLET ORAL EVERY 6 HOURS PRN
COMMUNITY

## 2020-05-09 RX ORDER — LABETALOL 20 MG/4 ML (5 MG/ML) INTRAVENOUS SYRINGE
10
Status: DISCONTINUED | OUTPATIENT
Start: 2020-05-09 | End: 2020-05-09 | Stop reason: HOSPADM

## 2020-05-09 RX ORDER — FENTANYL CITRATE 50 UG/ML
25-50 INJECTION, SOLUTION INTRAMUSCULAR; INTRAVENOUS EVERY 5 MIN PRN
Status: DISCONTINUED | OUTPATIENT
Start: 2020-05-09 | End: 2020-05-09 | Stop reason: HOSPADM

## 2020-05-09 RX ORDER — DEXAMETHASONE SODIUM PHOSPHATE 4 MG/ML
INJECTION, SOLUTION INTRA-ARTICULAR; INTRALESIONAL; INTRAMUSCULAR; INTRAVENOUS; SOFT TISSUE PRN
Status: DISCONTINUED | OUTPATIENT
Start: 2020-05-09 | End: 2020-05-09

## 2020-05-09 RX ORDER — MEPERIDINE HYDROCHLORIDE 50 MG/ML
12.5 INJECTION INTRAMUSCULAR; INTRAVENOUS; SUBCUTANEOUS
Status: DISCONTINUED | OUTPATIENT
Start: 2020-05-09 | End: 2020-05-09 | Stop reason: HOSPADM

## 2020-05-09 RX ORDER — NALOXONE HYDROCHLORIDE 0.4 MG/ML
.1-.4 INJECTION, SOLUTION INTRAMUSCULAR; INTRAVENOUS; SUBCUTANEOUS
Status: DISCONTINUED | OUTPATIENT
Start: 2020-05-09 | End: 2020-05-09 | Stop reason: HOSPADM

## 2020-05-09 RX ORDER — LISINOPRIL/HYDROCHLOROTHIAZIDE 10-12.5 MG
1 TABLET ORAL DAILY
COMMUNITY
Start: 2020-04-01 | End: 2021-04-01

## 2020-05-09 RX ORDER — HYDRALAZINE HYDROCHLORIDE 20 MG/ML
2.5-5 INJECTION INTRAMUSCULAR; INTRAVENOUS EVERY 10 MIN PRN
Status: DISCONTINUED | OUTPATIENT
Start: 2020-05-09 | End: 2020-05-09 | Stop reason: HOSPADM

## 2020-05-09 RX ORDER — ONDANSETRON 2 MG/ML
INJECTION INTRAMUSCULAR; INTRAVENOUS PRN
Status: DISCONTINUED | OUTPATIENT
Start: 2020-05-09 | End: 2020-05-09

## 2020-05-09 RX ORDER — METOPROLOL TARTRATE 1 MG/ML
1-2 INJECTION, SOLUTION INTRAVENOUS EVERY 5 MIN PRN
Status: DISCONTINUED | OUTPATIENT
Start: 2020-05-09 | End: 2020-05-09 | Stop reason: HOSPADM

## 2020-05-09 RX ORDER — ALBUTEROL SULFATE 0.83 MG/ML
2.5 SOLUTION RESPIRATORY (INHALATION) EVERY 4 HOURS PRN
Status: DISCONTINUED | OUTPATIENT
Start: 2020-05-09 | End: 2020-05-09 | Stop reason: HOSPADM

## 2020-05-09 RX ORDER — CEFAZOLIN SODIUM IN 0.9 % NACL 3 G/100 ML
3 INTRAVENOUS SOLUTION, PIGGYBACK (ML) INTRAVENOUS
Status: COMPLETED | OUTPATIENT
Start: 2020-05-09 | End: 2020-05-09

## 2020-05-09 RX ORDER — SODIUM CHLORIDE, SODIUM LACTATE, POTASSIUM CHLORIDE, CALCIUM CHLORIDE 600; 310; 30; 20 MG/100ML; MG/100ML; MG/100ML; MG/100ML
INJECTION, SOLUTION INTRAVENOUS CONTINUOUS
Status: DISCONTINUED | OUTPATIENT
Start: 2020-05-09 | End: 2020-05-09 | Stop reason: HOSPADM

## 2020-05-09 RX ORDER — HYDROMORPHONE HYDROCHLORIDE 1 MG/ML
0.5 INJECTION, SOLUTION INTRAMUSCULAR; INTRAVENOUS; SUBCUTANEOUS
Status: DISCONTINUED | OUTPATIENT
Start: 2020-05-09 | End: 2020-05-09

## 2020-05-09 RX ORDER — ONDANSETRON 2 MG/ML
4 INJECTION INTRAMUSCULAR; INTRAVENOUS EVERY 6 HOURS PRN
Status: DISCONTINUED | OUTPATIENT
Start: 2020-05-09 | End: 2020-05-09 | Stop reason: HOSPADM

## 2020-05-09 RX ORDER — BUPIVACAINE HYDROCHLORIDE 5 MG/ML
INJECTION, SOLUTION EPIDURAL; INTRACAUDAL PRN
Status: DISCONTINUED | OUTPATIENT
Start: 2020-05-09 | End: 2020-05-09 | Stop reason: HOSPADM

## 2020-05-09 RX ORDER — LIDOCAINE HYDROCHLORIDE 10 MG/ML
INJECTION, SOLUTION INFILTRATION; PERINEURAL PRN
Status: DISCONTINUED | OUTPATIENT
Start: 2020-05-09 | End: 2020-05-09

## 2020-05-09 RX ORDER — ACETAMINOPHEN 650 MG/1
650 SUPPOSITORY RECTAL EVERY 4 HOURS PRN
Status: DISCONTINUED | OUTPATIENT
Start: 2020-05-09 | End: 2020-05-09 | Stop reason: HOSPADM

## 2020-05-09 RX ORDER — FENTANYL CITRATE 50 UG/ML
25-50 INJECTION, SOLUTION INTRAMUSCULAR; INTRAVENOUS
Status: DISCONTINUED | OUTPATIENT
Start: 2020-05-09 | End: 2020-05-09 | Stop reason: HOSPADM

## 2020-05-09 RX ORDER — FENTANYL CITRATE 50 UG/ML
INJECTION, SOLUTION INTRAMUSCULAR; INTRAVENOUS PRN
Status: DISCONTINUED | OUTPATIENT
Start: 2020-05-09 | End: 2020-05-09

## 2020-05-09 RX ORDER — CEFAZOLIN SODIUM 1 G/3ML
1 INJECTION, POWDER, FOR SOLUTION INTRAMUSCULAR; INTRAVENOUS SEE ADMIN INSTRUCTIONS
Status: DISCONTINUED | OUTPATIENT
Start: 2020-05-09 | End: 2020-05-09 | Stop reason: HOSPADM

## 2020-05-09 RX ORDER — ACETAMINOPHEN 325 MG/1
650 TABLET ORAL EVERY 4 HOURS PRN
Status: DISCONTINUED | OUTPATIENT
Start: 2020-05-09 | End: 2020-05-09 | Stop reason: HOSPADM

## 2020-05-09 RX ORDER — LEVOTHYROXINE SODIUM 50 UG/1
50 TABLET ORAL
COMMUNITY
Start: 2020-04-02

## 2020-05-09 RX ORDER — ONDANSETRON 2 MG/ML
4 INJECTION INTRAMUSCULAR; INTRAVENOUS EVERY 30 MIN PRN
Status: DISCONTINUED | OUTPATIENT
Start: 2020-05-09 | End: 2020-05-09 | Stop reason: HOSPADM

## 2020-05-09 RX ORDER — HYDROCODONE BITARTRATE AND ACETAMINOPHEN 5; 325 MG/1; MG/1
1 TABLET ORAL
Status: COMPLETED | OUTPATIENT
Start: 2020-05-09 | End: 2020-05-09

## 2020-05-09 RX ORDER — ONDANSETRON 4 MG/1
4 TABLET, ORALLY DISINTEGRATING ORAL EVERY 30 MIN PRN
Status: DISCONTINUED | OUTPATIENT
Start: 2020-05-09 | End: 2020-05-09 | Stop reason: HOSPADM

## 2020-05-09 RX ADMIN — PROPOFOL 200 MG: 10 INJECTION, EMULSION INTRAVENOUS at 09:30

## 2020-05-09 RX ADMIN — HYDROCODONE BITARTRATE AND ACETAMINOPHEN 1 TABLET: 5; 325 TABLET ORAL at 12:58

## 2020-05-09 RX ADMIN — HYDROMORPHONE HYDROCHLORIDE 0.5 MG: 1 INJECTION, SOLUTION INTRAMUSCULAR; INTRAVENOUS; SUBCUTANEOUS at 07:56

## 2020-05-09 RX ADMIN — GLYCOPYRROLATE 0.2 MG: 0.2 INJECTION, SOLUTION INTRAMUSCULAR; INTRAVENOUS at 09:52

## 2020-05-09 RX ADMIN — SODIUM CHLORIDE, POTASSIUM CHLORIDE, SODIUM LACTATE AND CALCIUM CHLORIDE: 600; 310; 30; 20 INJECTION, SOLUTION INTRAVENOUS at 09:23

## 2020-05-09 RX ADMIN — Medication 3 MG: at 11:03

## 2020-05-09 RX ADMIN — Medication 3 G: at 09:23

## 2020-05-09 RX ADMIN — PHENYLEPHRINE HYDROCHLORIDE 100 MCG: 10 INJECTION INTRAVENOUS at 10:05

## 2020-05-09 RX ADMIN — LIDOCAINE HYDROCHLORIDE 50 MG: 10 INJECTION, SOLUTION INFILTRATION; PERINEURAL at 09:30

## 2020-05-09 RX ADMIN — FENTANYL CITRATE 100 MCG: 50 INJECTION, SOLUTION INTRAMUSCULAR; INTRAVENOUS at 09:30

## 2020-05-09 RX ADMIN — TAZOBACTAM SODIUM AND PIPERACILLIN SODIUM 3.38 G: 375; 3 INJECTION, SOLUTION INTRAVENOUS at 07:56

## 2020-05-09 RX ADMIN — HYDROMORPHONE HYDROCHLORIDE 1 MG: 1 INJECTION, SOLUTION INTRAMUSCULAR; INTRAVENOUS; SUBCUTANEOUS at 03:34

## 2020-05-09 RX ADMIN — MIDAZOLAM 2 MG: 1 INJECTION INTRAMUSCULAR; INTRAVENOUS at 09:24

## 2020-05-09 RX ADMIN — HYDROMORPHONE HYDROCHLORIDE 0.5 MG: 1 INJECTION, SOLUTION INTRAMUSCULAR; INTRAVENOUS; SUBCUTANEOUS at 04:42

## 2020-05-09 RX ADMIN — ROCURONIUM BROMIDE 50 MG: 10 INJECTION INTRAVENOUS at 09:41

## 2020-05-09 RX ADMIN — SODIUM CHLORIDE, POTASSIUM CHLORIDE, SODIUM LACTATE AND CALCIUM CHLORIDE: 600; 310; 30; 20 INJECTION, SOLUTION INTRAVENOUS at 07:56

## 2020-05-09 RX ADMIN — SODIUM CHLORIDE, POTASSIUM CHLORIDE, SODIUM LACTATE AND CALCIUM CHLORIDE: 600; 310; 30; 20 INJECTION, SOLUTION INTRAVENOUS at 11:04

## 2020-05-09 RX ADMIN — GLYCOPYRROLATE 0.4 MG: 0.2 INJECTION, SOLUTION INTRAMUSCULAR; INTRAVENOUS at 11:03

## 2020-05-09 RX ADMIN — Medication 140 MG: at 09:30

## 2020-05-09 RX ADMIN — FENTANYL CITRATE 150 MCG: 50 INJECTION, SOLUTION INTRAMUSCULAR; INTRAVENOUS at 09:41

## 2020-05-09 RX ADMIN — ONDANSETRON HYDROCHLORIDE 4 MG: 2 INJECTION, SOLUTION INTRAVENOUS at 10:26

## 2020-05-09 RX ADMIN — HYDROMORPHONE HYDROCHLORIDE 0.5 MG: 1 INJECTION, SOLUTION INTRAMUSCULAR; INTRAVENOUS; SUBCUTANEOUS at 12:07

## 2020-05-09 RX ADMIN — HYDROMORPHONE HYDROCHLORIDE 0.5 MG: 1 INJECTION, SOLUTION INTRAMUSCULAR; INTRAVENOUS; SUBCUTANEOUS at 06:26

## 2020-05-09 RX ADMIN — DEXAMETHASONE SODIUM PHOSPHATE 4 MG: 4 INJECTION, SOLUTION INTRA-ARTICULAR; INTRALESIONAL; INTRAMUSCULAR; INTRAVENOUS; SOFT TISSUE at 09:52

## 2020-05-09 ASSESSMENT — ENCOUNTER SYMPTOMS
VOMITING: 0
DIAPHORESIS: 0
SHORTNESS OF BREATH: 0
NAUSEA: 0
ABDOMINAL PAIN: 1
FEVER: 0
DIARRHEA: 0
COUGH: 0

## 2020-05-09 ASSESSMENT — MIFFLIN-ST. JEOR: SCORE: 2014.93

## 2020-05-09 NOTE — H&P
[facility]    History and Physical  Hospitalist       Date of Admission:  5/9/2020    Assessment & Plan   Yamilex Mosher is a 35 year old female with no significant past medical history who presents with abdominal pain.    #Symptomatic Cholelithiasis, possible early cholecystitis: Abdominal symptoms started approximately 4 months ago.  Described as epigastric and right-sided abdominal pain radiating toward the back.  Feels like squeezing and can be sharp.  It is been happening more frequently over the last 2 weeks.  Often times will wake her up from sleep.  This evening pain was most severe.  No nausea or vomiting.  No significant shortness of breath.  No persistent cough.  No fevers or chills.  No sick contacts.  No changes in diet.  She does not have a leukocytosis.  LFTs within normal limits.  Lipase within normal limits.  Abdominal ultrasound did show cholelithiasis with wall thickening and sonographic Childers sign suspicious for acute cholecystitis.  -NPO  -IV fluids, PRN pain medications  -Surgery consult  -Zosyn for now.  -CO VID-19 test for preoperative assessment.    In terms of her cardiac risk assessment, she does not have chest pain or chest discomfort.  She is able to walk 2 miles without stopping and does not get symptoms with activity.  She is able to walk up a flight of stairs without stopping.  She has had multiple EKGs that do not show evidence of ischemia.  Troponins have been negative.  She does not have any major risk factors including history of heart failure, diabetes mellitus acquiring insulin, CKD.  Her estimated risk of perioperative cardiac event would be 0.5% based on her revised cardiac risk index.    DVT Prophylaxis: Pneumatic Compression Devices  Code Status: Full Code  Dispo: We will admit to observation pending surgery evaluation for symptomatic cholelithiasis and possible early cholecystitis.    Catalino Mcneil MD    Primary Care Physician   Rosalinda Navarrete    Chief  Complaint   Abdominal Pain    History is obtained from the patient, patient's chart and discussed with ER physician    History of Present Illness   Yamilex Mosher is a 35 year old female with no significant past medical history who presents with abdominal pain.    Patient states that her symptoms started about 4 months ago or so.  She states that she was waking up with epigastric and right sided abdominal pain.  Felt like a squeezing sensation that radiated toward the back.  She states over the last few months this is been recurring.  It has become more frequent over the last couple of weeks.  She was seen in the ED 4 days prior with similar symptoms, though at that time was endorsing more chest pain.  She underwent out for chest pain including EKG, d-dimer, troponin chest x-ray that were negative.  She states that her pain went away but then came back this evening suddenly while she was sleeping and was very severe.  States it has been nonstop since that time.  No nausea or vomiting.  No significant shortness of breath.  No persistent cough.  No fevers or chills.  No sick contacts.  No changes in diet.    Of note, patient has no cardiac history.  She has no family history of cardiac disease.  She is able to walk up a flight of steps without stopping.  She typically walks 2 miles regularly without any discomfort or pain symptoms.    In the ED, patient was afebrile and hemodynamically stable.  His were notable for a normal CMP.  Negative lipase.  Normal CBC.  Negative troponin.  KG showed sinus rhythm without any acute ST segment depressions or elevations.  Chest x-ray was clear.  She had an abdominal ultrasound did show cholelithiasis with wall thickening and sonographic Childers sign suspicious for acute cholecystitis.    Past Medical History    I have reviewed this patient's medical history and updated it with pertinent information if needed.   Past Medical History:   Diagnosis Date     NO ACTIVE PROBLEMS       Thyroid disease     Temporarily dx with hypothyroidism       Past Surgical History   I have reviewed this patient's surgical history and updated it with pertinent information if needed.  Past Surgical History:   Procedure Laterality Date     GYN SURGERY       HC TOOTH EXTRACTION W/FORCEP       NO HISTORY OF SURGERY         Prior to Admission Medications   Prior to Admission Medications   Prescriptions Last Dose Informant Patient Reported? Taking?   benzonatate (TESSALON) 100 MG capsule   No No   Sig: Take 1 capsule (100 mg) by mouth 3 times daily as needed for cough      Facility-Administered Medications: None     Allergies   No Known Allergies    Social History   I have reviewed this patient's social history and updated it with pertinent information if needed. Yamilex Mosher  reports that she has been smoking. She has a 0.25 pack-year smoking history. She has never used smokeless tobacco. She reports current alcohol use. She reports that she does not use drugs.    Family History   I have reviewed this patient's family history and updated it with pertinent information if needed.   Family History   Problem Relation Age of Onset     Family History Negative Other        Review of Systems   The 10 point Review of Systems is negative other than noted in the HPI or here.     Physical Exam   Temp: 98.5  F (36.9  C) Temp src: Temporal BP: (!) 142/88 Pulse: 79 Heart Rate: 79 Resp: 16 SpO2: 100 %      Vital Signs with Ranges  Temp:  [98.5  F (36.9  C)-98.9  F (37.2  C)] 98.5  F (36.9  C)  Pulse:  [79-93] 79  Heart Rate:  [79-95] 79  Resp:  [15-24] 16  BP: (142-191)/() 142/88  SpO2:  [97 %-100 %] 100 %  0 lbs 0 oz    Constitutional: Obese female, no acute distress.  Lying in bed.  A bit sleepy secondary to pain medications.  HEENT: Normocephalic, mucous membranes are moist.  Cannot appreciate elevation of JVD  Respiratory: Normal work of breathing, clear bilaterally  Cardiovascular: Regular, no murmur noted  GI: Bowel  sounds present, obese.  Tender to palpation in the right upper quadrant and epigastric region.  No rebound or guarding.  Lymph/Hematologic: No bruising  Skin: No rashes  Musculoskeletal: Normal tone  Neurologic: Cranial nerves II through XII intact, moves all extremities.  No tremor noted  Psychiatric: Calm    Data   Data reviewed today:  I personally reviewed  Recent Labs   Lab 05/09/20  0330 05/05/20  1723   WBC 8.0 8.2   HGB 13.9 13.5   MCV 90 88    259    137   POTASSIUM 3.6 3.4   CHLORIDE 103 102   CO2 28 31   BUN 14 14   CR 0.72 0.69   ANIONGAP 5 4   MARILYN 8.8 8.4*   * 109*   ALBUMIN 3.6  --    PROTTOTAL 7.2  --    BILITOTAL 0.3  --    ALKPHOS 52  --    ALT 33  --    AST 18  --    LIPASE 95  --    TROPI <0.015 <0.015       Recent Results (from the past 24 hour(s))   Chest XR,  PA & LAT    Narrative    EXAM: XR CHEST 2 VW  LOCATION: [facility]  DATE/TIME: 5/9/2020 3:38 AM    INDICATION: Chest pain.  COMPARISON: 05/05/2020.      Impression    IMPRESSION: No focal air-space disease or other acute findings. Normal heart size.   Abdomen US, limited (RUQ only)    Narrative    EXAM: ULTRASOUND ABDOMEN LIMITED  LOCATION: [facility]  DATE/TIME: 05/09/2020, 3:41 AM    INDICATION: Upper abdominal pain.  COMPARISON: None.  TECHNIQUE: Limited abdominal ultrasound.    FINDINGS:    GALLBLADDER: Cholelithiasis. Gallbladder wall thickening up to 0.7 cm. Positive sonographic Childers's sign.    BILE DUCTS: The common duct measures 7 mm.    LIVER: Normal parenchyma with smooth contour. No focal mass.    RIGHT KIDNEY: No hydronephrosis.    PANCREAS: The visualized portions are normal.    No ascites.      Impression    IMPRESSION:  1.  Cholelithiasis with wall thickening and sonographic Childers's sign suspicious for acute cholecystitis.  2.  Mildly prominent common bile duct.

## 2020-05-09 NOTE — ANESTHESIA PREPROCEDURE EVALUATION
Anesthesia Pre-Procedure Evaluation    Patient: Yamilex Mosher   MRN: [id number] : 1985          Preoperative Diagnosis: Cholecystitis [K81.9]    Procedure(s):  CHOLECYSTECTOMY, LAPAROSCOPIC, WITH CHOLANGIOGRAM    Past Medical History:   Diagnosis Date     NO ACTIVE PROBLEMS      Thyroid disease     Temporarily dx with hypothyroidism     Past Surgical History:   Procedure Laterality Date     GYN SURGERY       HC TOOTH EXTRACTION W/FORCEP       NO HISTORY OF SURGERY       Anesthesia Evaluation     .             ROS/MED HX    ENT/Pulmonary:  - neg pulmonary ROS    (-) sleep apnea   Neurologic:       Cardiovascular:  - neg cardiovascular ROS       METS/Exercise Tolerance:     Hematologic:         Musculoskeletal:         GI/Hepatic:        (-) GERD   Renal/Genitourinary:         Endo:     (+) thyroid problem hypothyroidism, Obesity, .      Psychiatric:         Infectious Disease:         Malignancy:         Other:                          Physical Exam      Airway   Mallampati: III  TM distance: >3 FB  Neck ROM: full    Dental     Cardiovascular   Rhythm and rate: regular and normal      Pulmonary    breath sounds clear to auscultation            Lab Results   Component Value Date    WBC 8.0 2020    HGB 13.9 2020    HCT 42.7 2020     2020    SED 9 2006     2020    POTASSIUM 3.6 2020    CHLORIDE 103 2020    CO2 28 2020    BUN 14 2020    CR 0.72 2020     (H) 2020    MARILYN 8.8 2020    ALBUMIN 3.6 2020    PROTTOTAL 7.2 2020    ALT 33 2020    AST 18 2020    ALKPHOS 52 2020    BILITOTAL 0.3 2020    LIPASE 95 2020    TSH 2.46 2006    HCG Negative 2010    HCGS Negative 2010       Preop Vitals  BP Readings from Last 3 Encounters:   20 135/85   20 (!) 141/111   19 149/56    Pulse Readings from Last 3 Encounters:   20 92   20 89   19  "86      Resp Readings from Last 3 Encounters:   05/09/20 20   05/05/20 14   05/08/19 18    SpO2 Readings from Last 3 Encounters:   05/09/20 100%   05/05/20 96%   05/08/19 99%      Temp Readings from Last 1 Encounters:   05/09/20 98  F (36.7  C) (Temporal)    Ht Readings from Last 1 Encounters:   05/09/20 1.651 m (5' 5\")      Wt Readings from Last 1 Encounters:   05/09/20 132.4 kg (291 lb 14.4 oz)    Estimated body mass index is 48.57 kg/m  as calculated from the following:    Height as of this encounter: 1.651 m (5' 5\").    Weight as of this encounter: 132.4 kg (291 lb 14.4 oz).       Anesthesia Plan      History & Physical Review  History and physical reviewed and following examination; no interval change.    ASA Status:  3 .    NPO Status:  > 8 hours    Plan for General with Intravenous and Propofol induction. Maintenance will be Balanced.    PONV prophylaxis:  Ondansetron (or other 5HT-3)         Postoperative Care  Postoperative pain management:  IV analgesics, Oral pain medications and Multi-modal analgesia.      Consents  Anesthetic plan, risks, benefits and alternatives discussed with:  Patient..                 Scot Tolentino MD                    .  "

## 2020-05-09 NOTE — ED PROVIDER NOTES
"  History     Chief Complaint:  Chest Pain      HPI   Yamilex Mosher is a 35 year old female who presents with chest and abdominal pain.  She was seen in our emergency department 4 days prior with similar symptoms but less abdominal pain.  She has had episodes of the same type of pain since 4 months ago. Tonight the pain came on and was severe despite use of tylenol and so she presents to the emergency room. Not clearly associated with food or movement.  She notes she had spicy food tonight however.  Denies nausea or vomiting.  She notes her stool has been \"drier\" than normal but not black or bloody.  No diarrhea.  She notes she has a cough but only when the pain comes and also feels short of breath during those times but otherwise does not have cough or shortness of breath.  No hemoptysis.  She denies fever or chills.  She notes the chest and upper abdominal pain radiates to her back.  Denies any known exposures to ill persons.    Allergies:  No Known Allergies     Medications:    Zofran  Synthroid    Past Medical History:    Generalized headaches  Thyroid disease  Varicella uncomplicated    Past Surgical History:    Gynecological Surgery  [place] teeth extraction  Tooth extraction with foresep    Family History:    Mother - hyperlipidemia    Social History:  Tobacco use: Current Some day smoker; 0.2 packs/day  Alcohol use: Yes, less than one per week if no social functions  Drug use: no  Marital Status:   [4]     Review of Systems   Constitutional: Negative for diaphoresis and fever.   Respiratory: Negative for cough and shortness of breath.    Cardiovascular: Positive for chest pain.   Gastrointestinal: Positive for abdominal pain. Negative for diarrhea, nausea and vomiting.   All other systems reviewed and are negative.      Physical Exam     Patient Vitals for the past 24 hrs:   BP Temp Temp src Pulse Heart Rate Resp SpO2   05/09/20 0437 (!) 142/88 -- -- 79 79 16 100 %   05/09/20 0335 (!) 151/97 -- -- " -- 80 15 99 %   05/09/20 0304 (!) 191/136 98.5  F (36.9  C) Temporal 93 93 16 98 %   05/09/20 0259 -- 98.9  F (37.2  C) Oral -- 95 24 97 %       Physical Exam  General: Large adult female sitting upright, uncomfortable appearing.  Eyes: PERRL, Conjunctive within normal limits. No scleral icterus.  ENT: Moist mucous membranes, oropharynx clear.   CV: Normal S1S2, no murmur, rub or gallop. Regular rate and rhythm  Resp: Clear to auscultation bilaterally, no wheezes, rales or rhonchi. Normal respiratory effort.  GI: Abdomen is soft and nondistended. Epigastric and RUQ tenderness to palpation. No palpable masses. No rebound or guarding.  MSK: No edema. Nontender on palpation of the back. No calf asymmetry or tenderness. Normal active range of motion.  Skin: Warm and dry. No rashes or lesions or ecchymoses on visible skin.  Neuro: Alert and oriented. Responds appropriately to all questions and commands. No focal findings appreciated. Normal muscle tone.  Psych: Normal mood and affect. Pleasant.    Emergency Department Course     ECG:  Indication: Chest pain  Time: 0311  Vent. Rate 77 bpm. IL interval 134. QRS duration 94. QT/QTc 370/418. P-R-T axis 34 51 29.    Normal ECG  Normal rhythm with sinus arrhythmia.   When compared to EKG dated 05/05/2020, Premature ventricular complexes are no longer present. Read time: 0335       Imaging:  Radiology findings were communicated with the patient who voiced understanding of the findings.    Abdomen US, limited (RUQ only)  IMPRESSION:  1.  Cholelithiasis with wall thickening and sonographic Childers's sign suspicious for acute cholecystitis.  2.  Mildly prominent common bile duct.      Chest XR,  PA & LAT  IMPRESSION: No focal air-space disease or other acute findings. Normal heart size.    Readings per Radiology      Laboratory:  Laboratory findings were communicated with the patient who voiced understanding of the findings.    CBC: WBC: 8.0, HGB: 13.9, PLT: 254  CMP: Glucose 119  (H), o/w WNL (Creatinine: 0.72)  Troponin I (Collected at 0330): <0.015   Lipase: 95  UA with Microscopic: Blood: Trace (A), Bacteria: Few (A), Mucous: Present (A), o/w WNL    Interventions:  0334 Dilaudid 1 mg IV  0442 Dilaudid 0.5 mg IV    Emergency Department Course:  Past medical records, nursing notes, and vitals reviewed.    0310 I performed an exam of the patient as documented above.     EKG obtained in the ED, see results above.   IV was inserted and blood was drawn for laboratory testing, results above.  The patient provided a urine sample here in the emergency department. This was sent for laboratory testing, findings above.  The patient was sent for a Chest XR and Abdomen US (limited: RUQ) while in the emergency department, results above.     0428 I rechecked the patient and discussed the results of her workup thus far. She notes the pain medication significantly helped her pain, but it is returning. Repeat abdominal examination shows abdominal tenderness and guarding on palpation of the RUQ.    Findings and plan explained to the Patient who consents to admission. Discussed the patient with Dr. Mcneil, who will admit the patient to an observation medical/surgical bed for further monitoring, evaluation, and treatment.    I personally reviewed the laboratory and imaging results with the Patient and answered all related questions prior to admission.     Impression & Plan   Medical Decision Making:  Yamilex Mosher is a 35-year-old female who presents emergency department with concerns for ongoing episodes of abdominal chest pain.  Pain radiates to the back and seems to originate in the abdomen.  She was seen here 4 days ago with negative cardiopulmonary work-up including cardiac biomarkers and EKG and chest x-ray.  Repeat troponin and EKG today did not show any signs of ischemia or injury, making ACS an unlikely cause of her symptoms. Her symptoms today seem to fairly clearly originate in the abdomen  specifically the right upper quadrant.  Although she has no leukocytosis or elevated liver function tests and bilirubin she has evidence on ultrasound to suggest acute cholecystitis.  He had significant improvement with IV narcotic pain medication but continued to require repeat dosing and continue to have ongoing abdominal tenderness with voluntary guarding.  She currently has symptomatic cholelithiasis, possible early cholecystitis, and would benefit from ongoing pain control and general surgery consult as an admission.  Despite lack of significant COVID-19 symptoms, a swab was obtained here in the emergency department in preparation for possible surgery.  I discussed this plan with her and she verbalized understanding agreement.  All question answered prior to admission.    Diagnosis:    ICD-10-CM    1. Symptomatic cholelithiasis  K80.20 UA with Microscopic   2. Hypertension, unspecified type  I10        Disposition:  Admitted to Dr. Mcneil of the hospitalist service to an observation med/surg bed in stable condition.      Scribe Disclosure:  Francisco Javier WINN, am serving as a scribe at 3:10 AM on 5/9/2020 to document services personally performed by Christa Pierce MD based on my observations and the provider's statements to me.   5/9/2020   [facility] EMERGENCY DEPARTMENT       Christa Pierce MD  05/09/20 0453

## 2020-05-09 NOTE — DISCHARGE SUMMARY
[facility] Outpatient / Observation Unit  Discharge Summary        Yamilex Mosher MRN# [id number]   YOB: 1985 Age: 35 year old     Date of Admission:  5/9/2020  Date of Discharge:  5/9/2020  Admitting Physician:  Catalino Mcneil MD  Discharge Physician: Lana Jama PA-C  Discharging Service: Hospitalist      Primary Provider: Rosalinda Navarrete  Primary Care Physician Phone Number: [phone]         Primary Discharge Diagnoses:    Yamilex Mosher was admitted on 5/9/2020 for intractable biliary colic/acute cholecystitis.     1. Intractable biliary colic/acute cholecystitis  2. S/p lap laura, pls see full OP report.         Secondary Discharge Diagnoses:     Past Medical History:   Diagnosis Date     NO ACTIVE PROBLEMS      Thyroid disease     Temporarily dx with hypothyroidism            Code Status:      Full Code        Brief Hospital Summary:        Reason for your hospital stay      Acute laura status post laparoscopic cholecystitis             Please refer to initial admission history and physical for further details.   Briefly, Yamilex Mosher was admitted on 5/9/2020 with intractable biliary colic/acute cholecystitis.   Initial work up in the ED did not reveal evidence of sepsis to require inpatient hospitalization. Pt was registered to the Observation Unit for pain control and supportive measures.     Pt was resuscitated with IVF, and anti-emetics. Laboratory and imaging results indicated: biliary colic and concern for early acute cholecystitis. General surgery was consulted. Patient underwent laparoscopic cholecystectomy (please see detailed operative report). Post -op, pt did well. Pain control was transitioned from IV to PO form. At the time of discharge, pt's pain was controlled and was able to tolerate PO intake.  Medications were reviewed. Pt is instructed to follow up as below.             Significant Lab During Hospitalization:      Recent Labs   Lab 05/09/20  0330 05/05/20  1723   WBC  8.0 8.2   HGB 13.9 13.5   HCT 42.7 41.0   MCV 90 88    259     Recent Labs   Lab 05/09/20  0330 05/05/20  1723    137   POTASSIUM 3.6 3.4   CHLORIDE 103 102   CO2 28 31   ANIONGAP 5 4   * 109*   BUN 14 14   CR 0.72 0.69   GFRESTIMATED >90 >90   GFRESTBLACK >90 >90   MARILYN 8.8 8.4*     No results for input(s): CULT in the last 168 hours.  Recent Labs   Lab 05/09/20  0330   AST 18   ALT 33   ALKPHOS 52   BILITOTAL 0.3     Recent Labs   Lab 05/09/20  0330   LIPASE 95     Recent Labs   Lab 05/09/20  0429   COLOR Straw   APPEARANCE Clear   URINEGLC Negative   URINEBILI Negative   URINEKETONE Negative   SG 1.013   UBLD Trace*   URINEPH 5.5   PROTEIN Negative   NITRITE Negative   LEUKEST Negative   RBCU 0   WBCU <1                Significant Imaging During Hospitalization:      Results for orders placed or performed during the hospital encounter of 05/09/20   Chest XR,  PA & LAT    Narrative    EXAM: XR CHEST 2 VW  LOCATION: [facility]  DATE/TIME: 5/9/2020 3:38 AM    INDICATION: Chest pain.  COMPARISON: 05/05/2020.      Impression    IMPRESSION: No focal air-space disease or other acute findings. Normal heart size.   Abdomen US, limited (RUQ only)    Narrative    EXAM: ULTRASOUND ABDOMEN LIMITED  LOCATION: [facility]  DATE/TIME: 05/09/2020, 3:41 AM    INDICATION: Upper abdominal pain.  COMPARISON: None.  TECHNIQUE: Limited abdominal ultrasound.    FINDINGS:    GALLBLADDER: Cholelithiasis. Gallbladder wall thickening up to 0.7 cm. Positive sonographic Childers's sign.    BILE DUCTS: The common duct measures 7 mm.    LIVER: Normal parenchyma with smooth contour. No focal mass.    RIGHT KIDNEY: No hydronephrosis.    PANCREAS: The visualized portions are normal.    No ascites.      Impression    IMPRESSION:  1.  Cholelithiasis with wall thickening and sonographic Childers's sign suspicious for acute cholecystitis.  2.  Mildly prominent common bile duct.                  Pending  "Results:        Unresulted Labs Ordered in the Past 30 Days of this Admission     Date and Time Order Name Status Description    5/9/2020 1055 Surgical pathology exam In process     5/9/2020 0449 SARS-CoV-2 COVID-19 Virus (Coronavirus) RT-PCR In process               Consultations This Hospital Stay:      Consultation during this admission received from:          Discharge Instructions and Follow-Up:      Follow-up Appointments     Follow-up and recommended labs and tests       Follow up with General Surgeon as instructed.                 Discharge Disposition:      Discharged to home         Discharge Medications:        Current Discharge Medication List      START taking these medications    Details   HYDROcodone-acetaminophen (NORCO) 5-325 MG tablet Take 1-2 tablets by mouth every 4 hours as needed for moderate to severe pain  Qty: 10 tablet, Refills: 0    Associated Diagnoses: Calculus of gallbladder with acute cholecystitis without obstruction         CONTINUE these medications which have NOT CHANGED    Details   acetaminophen (TYLENOL) 500 MG tablet Take 500-1,000 mg by mouth every 6 hours as needed      levothyroxine (SYNTHROID/LEVOTHROID) 50 MCG tablet Take 50 mcg by mouth every morning (before breakfast)      lisinopril-hydrochlorothiazide (ZESTORETIC) 10-12.5 MG tablet Take 1 tablet by mouth daily                 Allergies:       No Known Allergies        Condition and Physical on Discharge:      Discharge condition: Stable   Vitals: Blood pressure 130/77, pulse 74, temperature 98  F (36.7  C), temperature source Temporal, resp. rate 18, height 1.651 m (5' 5\"), weight 132.4 kg (291 lb 14.4 oz), last menstrual period 04/28/2020, SpO2 100 %, not currently breastfeeding.  291 lbs 14.4 oz      GENERAL:  Comfortable.  PSYCH: pleasant, oriented, No acute distress.  HEENT:  PERRLA. Normal conjunctiva, normal hearing, nasal mucosa and Oropharynx are normal.  NECK:  Supple, no neck vein distention, adenopathy or " bruits, normal thyroid.  HEART:  Normal S1, S2 with no murmur, no pericardial rub, gallops or S3 or S4.  LUNGS:  Clear to auscultation, normal Respiratory effort. No wheezing, rales or ronchi.  ABDOMEN:  Soft, no hepatosplenomegaly, normal bowel sounds. Appropriate zak-incisional tenderness. Dressing c/d/i  EXTREMITIES:  No pedal edema, +2 pulses bilateral and equal.  SKIN:  Dry to touch, No rash, wound or ulcerations.  NEUROLOGIC:  CN 2-12 intact, BL 5/5 symmetric upper and lower extremity strength, sensation is intact with no focal deficits.

## 2020-05-09 NOTE — ANESTHESIA CARE TRANSFER NOTE
Patient: Yamilex Mosher    Procedure(s):  CHOLECYSTECTOMY, LAPAROSCOPIC, WITH CHOLANGIOGRAM    Diagnosis: Cholecystitis [K81.9]  Diagnosis Additional Information: No value filed.    Anesthesia Type:   No value filed.     Note:  Airway :Face Mask    Comments: VSS.      Vitals: (Last set prior to Anesthesia Care Transfer)    CRNA VITALS  5/9/2020 1059 - 5/9/2020 1133      5/9/2020             Pulse:  82    SpO2:  100 %                Electronically Signed By: VIVIAN Nguyen CRNA  May 9, 2020  11:33 AM

## 2020-05-09 NOTE — PHARMACY-ADMISSION MEDICATION HISTORY
Admission medication history interview status for this patient is complete. See [facility] admission navigator for allergy information, prior to admission medications and immunization status.     Medication history interview done via telephone during Covid-19 pandemic, indicate source(s): Patient  Medication history resources (including written lists, pill bottles, clinic record): SureScripts and Care Everywhere  Primary pharmacy:[facility]    Changes made to PTA medication list:  Added: all meds  Deleted: benzonatate (old)  Changed: none    Actions taken by pharmacist (provider contacted, etc): Called patient to verify med list.     Additional medication history information:None    Medication reconciliation/reorder completed by provider prior to medication history?  N    Prior to Admission medications    Medication Sig Last Dose Taking? Auth Provider   acetaminophen (TYLENOL) 500 MG tablet Take 500-1,000 mg by mouth every 6 hours as needed 5/8/2020 at pm Yes Unknown, Entered By History   levothyroxine (SYNTHROID/LEVOTHROID) 50 MCG tablet Take 50 mcg by mouth every morning (before breakfast) 5/8/2020 at am Yes Unknown, Entered By History   lisinopril-hydrochlorothiazide (ZESTORETIC) 10-12.5 MG tablet Take 1 tablet by mouth daily 5/8/2020 at am Yes Unknown, Entered By History

## 2020-05-09 NOTE — DISCHARGE INSTRUCTIONS
HOME CARE FOLLOWING LAPAROSCOPIC CHOLECYSTECTOMY  DARVIN Parkinson, RADHA Fisher R. O Donnell, J. Shaheen    INCISIONAL CARE:  Replace the bandage over your incisions DAILY until all drainage stops, or if more comfortable to have in place.  If present, leave the steri-strips (white paper tapes) in place for 14 days after surgery.  If Dermabond (a type of skin glue) is present, leave in place until it wears/flakes off (2-3 weeks).     BATHING:  OK to shower 48 hours after surgery.  Avoid baths for 1 week after surgery.  You may wash your hair at any time.  Gently pat your incision dry after bathing.  Do not apply lotions, creams, or ointments to incisions.    ACTIVITY:  Light Activity -- you may immediately be up and about as tolerated.  Walking is encouraged, increase as tolerated.  Driving/Light Work-- when comfortable and off narcotic pain medications.  Strenuous Work/Activity -- limit lifting to 20 pounds for 2 weeks.  Progressively increase with time.  Active Sports (running, biking, etc.) -- cautiously resume after 2 weeks.    DISCOMFORT:  Local anesthetic placed at surgery should provide relief for 4-8 hours.  Begin taking pain pills before discomfort is severe.  Take the pain medication with some food, when possible, to minimize side effects.  Intermittent use of ice packs may help during the first 1-3 weeks after surgery.  Expect gradual improvement.    Over-the-counter anti-inflammatory medications (i.e. Ibuprofen/Advil/Motrin or Naprosyn/Aleve) may be used per package instructions in addition to or while tapering off the narcotic pain medications to decrease swelling and sensitivity.  DO NOT TAKE these Anti-inflammatory medications if your primary physician has advised against doing so, or if you have acid reflux, ulcer, or bleeding disorder, or take blood-thinner medications.  Call your primary physician or the surgery office if you have medication questions.    After laparoscopic  cholecystectomy, you may have shoulder or upper back discomfort due to the gas used during surgery.  This is temporary and should resolve within 2-3 days.  Frequent short walks may help with this.  You may have decreased energy level for 1-2 weeks after surgery related to your recovery.    DIET:  Start with liquids and gradually increase diet as tolerated.  Drink plenty of fluids.  While taking pain medications, consider use of a stool softener, increase your fiber in your diet, or add a fiber supplement (like Metamucil, Citrucel) to help prevent constipation - a possible side effect of pain medications.  It is not uncommon to experience some bowel changes (loose stools or constipation) after surgery.  Your body has to adapt to you no longer having a gall bladder.  To help minimize this side effect, avoid fatty foods for 1-2 weeks after surgery.  You may then slowly increase the amount of fatty foods in your diet.      NAUSEA:  If nauseated from the anesthetic/pain meds; rest in bed, get up cautiously with assistance, and drink clear liquids (juice, tea, broth).    FOLLOW-UP AFTER SURGERY:  -Our office will contact you approximately 2-3 weeks after surgery to check on your progress and answer any questions you may have.  If you are doing well, you will not need to return for an office appointment.  If any concerns are identified over the phone, we will help you make an appointment to see a provider.    -If you have not received a phone call, have any questions or concerns, or would like to be seen, please call us at [phone].  We are located at: [address], [address]; [place] [address]    -CONTACT US IF THE FOLLOWING DEVELOPS:   1. A fever that is above 101     2. Increased redness, warmth, drainage, bleeding, or swelling.   3. Pain that is not relieved by rest/ice and your prescription.   4.  Increasing pain after 48 hours.   5. Drainage that is thick, cloudy, yellow, green or white.   6. Any other  questions or concerns.      FREQUENTLY ASKED QUESTIONS:    Q:  How should my incision look?    A:  Normally your incision will appear slightly swollen with light redness directly along the incision itself as it heals.  It may feel like a bump or ridge as the healing/scarring happens, and over time (3-4 months) this bump or ridge feeling should slowly go away.  In general, clear or pink watery drainage can be normal at first as your incision heals, but should decrease over time.    Q:  How do I know if my incision is infected?  A:  Look at your incision for signs of infection, like redness around the incision spreading to surrounding skin, or drainage of cloudy or foul-smelling drainage.  If you feel warm, check your temperature to see if you are running a fever.    **If any of these things occur, please notify the nurse at our office.  We may need you to come into the office for an incision check.      Q:  How do I take care of my incision?  A:  If you have a dressing in place - Starting the day after surgery, replace the dressing 1-2 times a day until there is no further drainage from the incision.  At that time, a dressing is no longer needed.  Try to minimize tape on the skin if irritation is occurring at the tape sites.  If you have significant irritation from tape on the skin, please call the office to discuss other method of dressing your incision.    Small pieces of tape called  steri-strips  may be present directly overlying your incision; these may be removed 10 days after surgery unless otherwise specified by your surgeon.  If these tapes start to loosen at the ends, you may trim them back until they fall off or are removed.    A:  If you had  Dermabond  tissue glue used as a dressing (this causes your incision to look shiny with a clear covering over it) - This type of dressing wears off with time and does not require more dressings over the top unless it is draining around the glue as it wears off.  Do  not apply ointments or lotions over the incisions until the glue has completely worn off.    Q:  There is a piece of tape or a sticky  lead  still on my skin.  Can I remove this?  A:  Sometimes the sticky  leads  used for monitoring during surgery or for evaluation in the emergency department are not all removed while you are in the hospital.  These sometimes have a tab or metal dot on them.  You can easily remove these on your own, like taking off a band-aid.  If there is a gel substance under the  lead , simply wipe/clean it off with a washcloth or paper towel.      Q:  What can I do to minimize constipation (very hard stools, or lack of stools)?  A:  Stay well hydrated.  Increase your dietary fiber intake or take a fiber supplement -with plenty of water.  Walk around frequently.  You may consider an over-the-counter stool-softener.  Your Pharmacist can assist you with choosing one that is stocked at your pharmacy.  Constipation is also one of the most common side effects of pain medication.  If you are using pain medication, be pro-active and try to PREVENT problems with constipation by taking the steps above BEFORE constipation becomes a problem.    Q:  What do I do if I need more pain medications?  A:  Call the office to receive refills.  Be aware that certain pain meds cannot be called into a pharmacy and actually require a paper prescription.  A change may be made in your pain med as you progress thru your recovery period or if you have side effects to certain meds.    --Pain meds are NOT refilled after 5pm on weekdays, and NOT AT ALL on the weekends, so please look ahead to prevent problems.      Q:  Why am I having a hard time sleeping now that I am at home?  A:  Many medications you receive while you are in the hospital can impact your sleep for a number of days after your surgery/hospitalization.  Decreased level of activity and naps during the day may also make sleeping at night difficult.  Try to  minimize day-time naps, and get up frequently during the day to walk around your home during your recovery time.  Sleep aides may be of some help, but are not recommended for long-term use.      Q:  I am having some back discomfort.  What should I do?  A:  This may be related to certain positioning that was required for your surgery, extended periods of time in bed, or other changes in your overall activity level.  You may try ice, heat, acetaminophen, or ibuprofen to treat this temporarily.  Note that many pain medications have acetaminophen in them and would state this on the prescription bottle.  Be sure not to exceed the maximum of 4000mg per day of acetaminophen.     **If the pain you are having does not resolve, is severe, or is a flare of back pain you have had on other occasions prior to surgery, please contact your primary physician for further recommendations or for an appointment to be examined at their office.    Q:  Why am I having headaches?  A:  Headaches can be caused by many things:  caffeine withdrawal, use of pain meds, dehydration, high blood pressure, lack of sleep, over-activity/exhaustion, flare-up of usual migraine headaches.  If you feel this is related to muscle tension (a band-like feeling around the head, or a pressure at the low-back of the head) you may try ice or heat to this area.  You may need to drink more fluids (try electrolyte drink like Gatorade), rest, or take your usual migraine medications.   **If your headaches do not resolve, worsen, are accompanied by other symptoms, or if your blood pressure is high, please call your primary physician for recommendation and/or examination.    Q:  I am unable to urinate.  What do I do?  A:  A small percentage of people can have difficulty urinating initially after surgery.  This includes being able to urinate only a very small amount at a time and feeling discomfort or pressure in the very low abdomen.  This is called  urinary retention ,  and is actually an urgent situation.  Proceed to your nearest Emergency department for evaluation (not an Urgent Care Center).  Sometimes the bladder does not work correctly after certain medications you receive during surgery, or related to certain procedures.  You may need to have a catheter placed until your bladder recovers.  When planning to go to an Emergency department, it may help to call the ER to let them know you are coming in for this problem after a surgery.  This may help you get in quicker to be evaluated.  **If you have symptoms of a urinary tract infection, please contact your primary physician for the proper evaluation and treatment.          If you have other questions, please call the office Monday thru Friday between 8am and 5pm to discuss with the nurse or physician assistant.  [phone]    There is a surgeon ON CALL on weekday evenings and over the weekend in case of urgent need only, and may be contacted at the same number.    If you are having an emergency, call 911 or proceed to your nearest emergency department.    GENERAL ANESTHESIA OR SEDATION ADULT DISCHARGE INSTRUCTIONS   SPECIAL PRECAUTIONS FOR 24 HOURS AFTER SURGERY    IT IS NOT UNUSUAL TO FEEL LIGHT-HEADED OR FAINT, UP TO 24 HOURS AFTER SURGERY OR WHILE TAKING PAIN MEDICATION.  IF YOU HAVE THESE SYMPTOMS; SIT FOR A FEW MINUTES BEFORE STANDING AND HAVE SOMEONE ASSIST YOU WHEN YOU GET UP TO WALK OR USE THE BATHROOM.    YOU SHOULD REST AND RELAX FOR THE NEXT 24 HOURS AND YOU MUST MAKE ARRANGEMENTS TO HAVE SOMEONE STAY WITH YOU FOR AT LEAST 24 HOURS AFTER YOUR DISCHARGE.  AVOID HAZARDOUS AND STRENUOUS ACTIVITIES.  DO NOT MAKE IMPORTANT DECISIONS FOR 24 HOURS.    DO NOT DRIVE ANY VEHICLE OR OPERATE MECHANICAL EQUIPMENT FOR 24 HOURS FOLLOWING THE END OF YOUR SURGERY.  EVEN THOUGH YOU MAY FEEL NORMAL, YOUR REACTIONS MAY BE AFFECTED BY THE MEDICATION YOU HAVE RECEIVED.    DO NOT DRINK ALCOHOLIC BEVERAGES FOR 24 HOURS FOLLOWING YOUR  SURGERY.    DRINK CLEAR LIQUIDS (APPLE JUICE, GINGER ALE, 7-UP, BROTH, ETC.).  PROGRESS TO YOUR REGULAR DIET AS YOU FEEL ABLE.    YOU MAY HAVE A DRY MOUTH, A SORE THROAT, MUSCLES ACHES OR TROUBLE SLEEPING.  THESE SHOULD GO AWAY AFTER 24 HOURS.    CALL YOUR DOCTOR FOR ANY OF THE FOLLOWING:  SIGNS OF INFECTION (FEVER, GROWING TENDERNESS AT THE SURGERY SITE, A LARGE AMOUNT OF DRAINAGE OR BLEEDING, SEVERE PAIN, FOUL-SMELLING DRAINAGE, REDNESS OR SWELLING.    IT HAS BEEN OVER 8 TO 10 HOURS SINCE SURGERY AND YOU ARE STILL NOT ABLE TO URINATE (PASS WATER).

## 2020-05-09 NOTE — BRIEF OP NOTE
[facility]    Brief Operative Note    Pre-operative diagnosis: Cholecystitis [K81.9]  Post-operative diagnosis Acute cholecystitis    Procedure: Procedure(s):  CHOLECYSTECTOMY, LAPAROSCOPIC, WITH CHOLANGIOGRAM  Surgeon: Surgeon(s) and Role:     * Tello Munoz MD - Primary     * Chandrika Osullivan PA-C - Assisting  Anesthesia: General   Estimated blood loss: Less than 10 ml  Drains: None  Specimens:   ID Type Source Tests Collected by Time Destination   A : gallbladder and contents Tissue Gallbladder and Contents SURGICAL PATHOLOGY EXAM Tello Munoz MD 5/9/2020 10:55 AM      Findings:   Gallstones, acute edema of the gallbladder wall, mildly prominent common duct without filling defects..  Complications: None.  Implants: * No implants in log *

## 2020-05-09 NOTE — ANESTHESIA POSTPROCEDURE EVALUATION
Patient: Yamilex Mosher    Procedure(s):  CHOLECYSTECTOMY, LAPAROSCOPIC, WITH CHOLANGIOGRAM    Diagnosis:Cholecystitis [K81.9]  Diagnosis Additional Information: No value filed.    Anesthesia Type:  No value filed.    Note:  Anesthesia Post Evaluation    Patient location during evaluation: PACU  Patient participation: Able to fully participate in evaluation  Level of consciousness: awake  Pain management: adequate  Airway patency: patent  Cardiovascular status: acceptable  Respiratory status: acceptable  Hydration status: acceptable  PONV: controlled     Anesthetic complications: None          Last vitals:  Vitals:    05/09/20 1140 05/09/20 1145 05/09/20 1200   BP: 135/70 130/77 135/85   Pulse: 76 74 92   Resp: 23 18 20   Temp:      SpO2: 100% 100% 100%         Electronically Signed By: Scot Tolentino MD  May 9, 2020  12:05 PM

## 2020-05-09 NOTE — OP NOTE
Procedure Date: 05/09/2020      PREOPERATIVE DIAGNOSIS:  Acute cholecystitis.      POSTOPERATIVE DIAGNOSIS:  Acute cholecystitis.      PROCEDURE:  Laparoscopic cholecystectomy with intraoperative cholangiogram.      ANESTHESIA:  General plus local.      SURGEON:  Tello Moran MD      ASSISTANT:  Chandrika Osullivan PA-C      The physician assistant was medically necessary for her skills in suturing, cutting the suture, exposure, traction, countertraction and camera management throughout the operation.      SPECIMENS:  Gallbladder and contents.      COMPLICATIONS:  None.      INDICATIONS:  Ms. Mosher is a 35-year-old female who was admitted to the ED overnight with abdominal pain.  She states that for the last 4 months or so she has had intermittent upper abdominal pain primarily in the evening.  Most of the time, being self-limited.  She had a recent cardiac workup which was negative.  Yesterday, she had the same pain, but earlier in the day and it persisted throughout much of the evening and night to the point where she sought evaluation in our ER.  There she was noted to have gallstones on an ultrasound with gallbladder wall thickening and tenderness in the right upper quadrant.  She had persistent symptoms this morning and exam findings consistent with cholecystitis.  For this reason, I offered cholecystectomy today.  Risks of the operation including infection, bleeding, harm to adjacent structures, open conversion, retained stone, bile leak, common duct injury as well as chronic diarrhea were all reviewed.  The patient verbalized understanding of the above and consented to proceed.      FINDINGS:  There were multiple gallstones within the gallbladder which was distended and edematous.  The critical view of safety was obtained prior to clipping and dividing the cystic duct.  Intraoperative cholangiograms were normal with the exception of some mild dilation of the common bile duct.      DESCRIPTION OF PROCEDURE:   With the patient under excellent general anesthesia in supine position, the abdomen was prepped and draped out in the usual sterile surgical fashion.  A timeout was performed confirming the patient, procedure to be done as well as drug allergies.  She did receive a dose of Ancef for infection prophylaxis prior to beginning our initial incision.  We began by elevating the superior umbilical skin fold and incising it longitudinally with an 11 blade.  Electrocautery and blunt dissection were then carried out down to the level of the epigastric fascia which was grasped and elevated into view.  The fascia was incised sharply with a #15 blade.  Stay sutures were placed at the apices and the peritoneum was punctured bluntly with a Carmalt.  We introduced a Manolo port through this defect, applied insufflation, placed the patient in reverse Trendelenburg position and rolled her slightly to her left as well.  Three further 5 mm ports placed under direct vision in the epigastrium and right upper quadrant.  The gallbladder was observed in the right upper quadrant and was mildly edematous and tensely distended, we aspirated approximately 50 mL of dark green, but mildly pale bile from it.  This allowed us to grasp and elevate the gallbladder and expose the infundibulum which was grasped and retracted medially and laterally after taking some omental adhesions down from the body of the gallbladder itself.  The serosa on each aspect was incised with electrocautery and swept downwards.  The node of Calot was identified.  The artery adjacent to it was bluntly surrounded, clipped and divided to open the window adjacent even further, we bluntly dissected through the tissues adjacent to the presumptive cystic duct with a Kitner and enlarged it further with electrocautery.  A Smith clamp was introduced into the field, clamped across the junction of the infundibulum and cystic duct, the cystic duct was cannulated, aspirated and flushed  without difficulty or resistance.  Cholangiogram was performed without difficulty, again noting mild dilation of the common duct, but no filling defects or abnormality of the anatomy.  The cystic duct was then clipped and divided.  The gallbladder was dissected free from the fossa with electrocautery and subsequently placed in an Endocatch pouch.  The right upper quadrant was copiously irrigated, suctioned dry and was satisfactorily hemostatic upon inspection.  The pneumoperitoneum was then evacuated using a close device.  The ports were all removed and the specimen was delivered through the epigastric fascia in the pouch and passed off for routine pathology.  We closed the fascia with an 0 Vicryl stitch in a figure-of-eight fashion x 2 and tied the stay sutures down over them.  Then 30 mL of 0.5% Marcaine then distributed in all incisions.  Skin was closed with 4-0 Vicryls in deep dermal interrupted fashion.  Skin glue was applied atop the closed wounds.  The patient tolerated the procedure well.  She was extubated and brought to recovery in excellent condition.  All sharps and sponge counts were correct at the conclusion of the case.         SUSAN GUSMAN MD             D: 2020   T: 2020   MT: NTS      Name:     JOEL FISHER   MRN:      [id number]        Account:        [id number]   :      1985           Procedure Date: 2020      Document: [id number]       cc: Rosalinda Navarrete MD

## 2020-05-09 NOTE — CONSULTS
Consult Date:  2020      REASON FOR CONSULTATION:  Acute cholecystitis.      HISTORY OF PRESENT ILLNESS:  Ms. Mosher is a 35-year-old female who has had about a 4-month history of intermittent epigastric/low chest pain which has been self-limited and typically goes away when she goes to sleep in the evenings.  She had recently been worked up for cardiac etiology of her chest pain, but this came back negative.  She states that she has had severe epigastric right upper quadrant pain radiating to the back since yesterday afternoon after returning home from work.  This is associated with some nausea and an episode of vomiting.  She denies any icterus or dark orange urine.  She is having persistent pain today, she sought evaluation in the ER overnight and was found to have gallstones with gallbladder wall thickening on an ultrasound.  She denies any fevers or cough.      PAST MEDICAL AND SURGICAL HISTORY:  Includes a prior  section, recent diagnosis of hypertension for which she has initiated treatment.      CURRENT MEDICATIONS:  At the time of admission, the patient was on Tylenol, levothyroxine and lisinopril.      ALLERGIES:  THE PATIENT HAS NO RECOGNIZED DRUG ALLERGIES.      SOCIAL HISTORY:  The patient is an occasional smoker.  She denies any significant alcohol use.      PHYSICAL EXAMINATION:   VITAL SIGNS:  The patient is afebrile, temperature is 97.1, pulse 85 with a blood pressure of 190/91,  previous to this, it was 131/103, respiratory rate is 16 with 97% saturations on room air.   GENERAL:  She is alert and appropriate, nontoxic, but clearly uncomfortable and in pain.   HEENT:  She has no appreciable scleral icterus.   HEART:  Sounds are regular.   RESPIRATORY:  Breath sounds are clear.   ABDOMEN:  Soft.  She has mild epigastric tenderness, but has focal right upper quadrant tenderness and a positive Childers's sign on deep inspiration.      LABORATORY EXAMS:  Notable for white blood cell count of  8.0 thousand, hemoglobin 13.9.  LFTs and lipase are all normal.      IMAGING:  I personally reviewed the patient's ultrasound done early this morning, this shows gallstones with wall thickening up to 7 mm and a sonographic Childers's sign.  Her common duct is 7 mm in diameter as well.      ASSESSMENT AND PLAN:  This is a 35-year-old female with intermittent biliary colic type symptoms over the preceding 4 months, now presents with persistent pain since yesterday with tenderness and a Childers's sign and ultrasound findings consistent with cholecystitis.  I have offered her cholecystectomy today as I suspect that it will take her more than a week to recover from her current episode without intervention.  I diagrammed the biliary tree and discussed the pathophysiology of biliary colic and acute cholecystitis.  Risks of the operation were outlined in detail.  These include infection, bleeding, harm to adjacent structures, open conversion, retained stone, bile leak, common duct injury and chronic diarrhea as well as her anticipated convalescence and the potential to discharge to home today.  She understood all this well and had no questions for me at this time.  We plan on proceeding to the OR on the add-on schedule Saturday morning.      Thank you very much for this consultation.         SUSAN GUSMAN MD             D: 2020   T: 2020   MT: ANA      Name:     JOEL FISHER   MRN:      [id number]        Account:       [id number]   :      1985           Consult Date:  2020      Document: [id number]       cc: Rosalinda Navarrete MD

## 2020-05-09 NOTE — ED NOTES
[facility]  ED Nurse Handoff Report    Yamilex Mosher is a 35 year old female   ED Chief complaint: Chest Pain  . ED Diagnosis:   Final diagnoses:   None     Allergies: No Known Allergies    Code Status: Full Code  Activity level - Baseline/Home:  Independent. Activity Level - Current:   Independent. Lift room needed: No. Bariatric: No   Needed: No   Isolation: COVID swab pending. Infection: No symptoms of COVID.  COVID swab sent per policy for surgical patients.     Vital Signs:   Vitals:    05/09/20 0259 05/09/20 0304 05/09/20 0335   BP:  (!) 191/136 (!) 151/97   Pulse:  93    Resp: 24 16 15   Temp: 98.9  F (37.2  C) 98.5  F (36.9  C)    TempSrc: Oral Temporal    SpO2: 97% 98% 99%       Cardiac Rhythm:  ,      Pain level: 0-10 Pain Scale: 8  Patient confused: No. Patient Falls Risk: No.   Elimination Status: Has voided   Patient Report - Initial Complaint: Pt reports some chest and back pain, cough and diarrhea 2 days ago. Focused Assessment: Gastrointestinal - GI WDL: -WDL except; GI symptoms; palpation  Abdominal Palpation: RUQ  GI Signs/Symptoms: abdominal pain; diarrhea   Gastrointestinal - RUQ Abdominal Palpation: tender    Tests Performed: Lab, UA, CXR, US. Abnormal Results:   Labs Ordered and Resulted from Time of ED Arrival Up to the Time of Departure from the ED   COMPREHENSIVE METABOLIC PANEL - Abnormal; Notable for the following components:       Result Value    Glucose 119 (*)     All other components within normal limits   CBC WITH PLATELETS DIFFERENTIAL   TROPONIN I   LIPASE   ROUTINE UA WITH MICROSCOPIC   PERIPHERAL IV CATHETER   CARDIAC CONTINUOUS MONITORING     Abdomen US, limited (RUQ only)   Final Result   IMPRESSION:   1.  Cholelithiasis with wall thickening and sonographic Childers's sign suspicious for acute cholecystitis.   2.  Mildly prominent common bile duct.         Chest XR,  PA & LAT   Final Result   IMPRESSION: No focal air-space disease or other acute  findings. Normal heart size.        .   Treatments provided: See MAR  Family Comments: None present  OBS brochure/video discussed/provided to patient:  Yes  ED Medications:   Medications   HYDROmorphone (DILAUDID) injection 1 mg (1 mg Intravenous Given 5/9/20 0334)     Drips infusing:  No  For the majority of the shift, the patient's behavior Green. Interventions performed were none.    Sepsis treatment initiated: No       ED Nurse Name/Phone Number: Nicola Perez RN,   4:36 AM  RECEIVING UNIT ED HANDOFF REVIEW    Above ED Nurse Handoff Report was reviewed: Yes  Reviewed by: Camilo Floyd RN on May 9, 2020 at 5:29 AM

## 2020-05-09 NOTE — OR NURSING
Family arrived.  Pt assisted into vehicle and instructions reveiwed with Steve (spouse) at [facility].  All questions answered, given ice pack, prescription, copy of discharge instructions and pictures.  Home wth family at this time.

## 2020-05-09 NOTE — PLAN OF CARE
Patient left to pre-op at 0825, writer was unable to do a full assessment. Patient was having 10/10 abdominal pain, IV dilaudid given.  IVF and zosyn started before leaving the unit.  Bed held for potential return.

## 2020-05-11 LAB — INTERPRETATION ECG - MUSE: NORMAL

## 2020-05-12 LAB — COPATH REPORT: NORMAL

## 2020-05-22 ENCOUNTER — TELEPHONE (OUTPATIENT)
Dept: SURGERY | Facility: CLINIC | Age: 35
End: 2020-05-22

## 2020-05-22 NOTE — TELEPHONE ENCOUNTER
Attempted to call patient for post op check.  No answer.  Voice message and Mychart message was left for patient to call back if they had any questions of concerns.     Chandrika Osullivan PA-C

## 2020-09-17 ENCOUNTER — THERAPY VISIT (OUTPATIENT)
Dept: PHYSICAL THERAPY | Facility: CLINIC | Age: 35
End: 2020-09-17
Payer: COMMERCIAL

## 2020-09-17 DIAGNOSIS — M25.561 CHRONIC PAIN OF BOTH KNEES: ICD-10-CM

## 2020-09-17 DIAGNOSIS — G89.29 CHRONIC PAIN OF BOTH KNEES: ICD-10-CM

## 2020-09-17 DIAGNOSIS — M25.562 CHRONIC PAIN OF BOTH KNEES: ICD-10-CM

## 2020-09-17 PROCEDURE — 97161 PT EVAL LOW COMPLEX 20 MIN: CPT | Mod: GP | Performed by: PHYSICAL THERAPIST

## 2020-09-17 PROCEDURE — 97110 THERAPEUTIC EXERCISES: CPT | Mod: GP | Performed by: PHYSICAL THERAPIST

## 2020-09-17 ASSESSMENT — ACTIVITIES OF DAILY LIVING (ADL)
LIMPING: I HAVE THE SYMPTOM BUT IT DOES NOT AFFECT MY ACTIVITY
SQUAT: ACTIVITY IS VERY DIFFICULT
GO DOWN STAIRS: ACTIVITY IS MINIMALLY DIFFICULT
STIFFNESS: I HAVE THE SYMPTOM BUT IT DOES NOT AFFECT MY ACTIVITY
SIT WITH YOUR KNEE BENT: ACTIVITY IS MINIMALLY DIFFICULT
PAIN: I HAVE THE SYMPTOM BUT IT DOES NOT AFFECT MY ACTIVITY
GO UP STAIRS: ACTIVITY IS FAIRLY DIFFICULT
KNEE_ACTIVITY_OF_DAILY_LIVING_SCORE: 62.86
WEAKNESS: I HAVE THE SYMPTOM BUT IT DOES NOT AFFECT MY ACTIVITY
KNEE_ACTIVITY_OF_DAILY_LIVING_SUM: 44
KNEEL ON THE FRONT OF YOUR KNEE: ACTIVITY IS VERY DIFFICULT
HOW_WOULD_YOU_RATE_THE_CURRENT_FUNCTION_OF_YOUR_KNEE_DURING_YOUR_USUAL_DAILY_ACTIVITIES_ON_A_SCALE_FROM_0_TO_100_WITH_100_BEING_YOUR_LEVEL_OF_KNEE_FUNCTION_PRIOR_TO_YOUR_INJURY_AND_0_BEING_THE_INABILITY_TO_PERFORM_ANY_OF_YOUR_USUAL_DAILY_ACTIVITIES?: 50
HOW_WOULD_YOU_RATE_THE_OVERALL_FUNCTION_OF_YOUR_KNEE_DURING_YOUR_USUAL_DAILY_ACTIVITIES?: ABNORMAL
WALK: ACTIVITY IS MINIMALLY DIFFICULT
RAW_SCORE: 44
AS_A_RESULT_OF_YOUR_KNEE_INJURY,_HOW_WOULD_YOU_RATE_YOUR_CURRENT_LEVEL_OF_DAILY_ACTIVITY?: ABNORMAL
GIVING WAY, BUCKLING OR SHIFTING OF KNEE: I HAVE THE SYMPTOM BUT IT DOES NOT AFFECT MY ACTIVITY
RISE FROM A CHAIR: ACTIVITY IS VERY DIFFICULT
STAND: ACTIVITY IS SOMEWHAT DIFFICULT
SWELLING: I HAVE THE SYMPTOM BUT IT DOES NOT AFFECT MY ACTIVITY

## 2020-09-17 NOTE — PROGRESS NOTES
[facility] Initial Evaluation  Subjective:  The history is provided by the patient.   Patient Health History  [facility] B Nomi being seen for rt greater than left knee pain.     Problem began: 9/11/2020 (MD visit, has had pain x 4 years).   Problem occurred: Insidious   Pain is reported as 4/10 on pain scale.  General health as reported by patient is fair.  Pertinent medical history includes: high blood pressure, thyroid problems and overweight.   Red flags:  None as reported by patient.         Current medications:  High blood pressure medication and thyroid medication.       Primary job tasks include:  Computer work, prolonged standing, prolonged sitting and repetitive tasks.                  Therapist Generated HPI Evaluation  Problem details: Had a cortisone shot in both knees one month ago.  Discussed getting 3 doses, injection for cartilage.  Tomorrow getting the third injection.  Knee pain is better over the past month.  Rt knee is still pretty bad.  Had fluid drained from rt knee last week.   4 years ago diagnosed with knee OA.  Knee started getting worse again 1 month ago. Has been having LBP since May, strained LB shortly after abdominal surgery. .         Type of problem:  Bilateral knees.    This is a chronic condition.  Condition occurred with:  Repetition/overuse and degenerative joint disease.  Where condition occurred: for unknown reasons.  Patient reports pain:  Anterior and medial.  and is intermittent.    Since onset symptoms are gradually worsening.  Associated symptoms:  Loss of motion/stiffness. Symptoms are exacerbated by kneeling, bending/squatting, ascending stairs and descending stairs (up stairs worse, one step at a time when going down.  Boxing club - difficulty with warm up, squatting, jumping jacks.  )    Special tests included:  X-ray.  Previous treatment includes physical therapy.   Barriers include:  None as reported by patient.                         Objective:  Standing Alignment:              Knee deviations alignment: B genu valgus in standing, HE in standing.      Gait:    Gait Type:  Normal   Weight Bearing Status:  WBAT     Deviations:  Hip:  Trendelenberg L and Trendelenberg R                                                      Knee Evaluation:  ROM:    AROM      Extension: Left: 0    Right:   Flexion: Left: 100    Right: 105  PROM    Hyperextension: Left: 5    Right:  5          Strength:     Extension:  Left: 5-/5    Pain:-      Right: 5-/5    Pain:-  Flexion:  Left: 4+/5    Pain:-      Right: 4/5    Pain:+    Quad Set Left: Fair    Pain:   Quad Set Right: Fair    Pain:  Ligament Testing:  Not Assessed                Special Tests:     Left knee negative for the following special tests:  Patellar Tracking-Abduction Lateral    Right knee negative for the following special tests:  Patellar Tracking-Abduction Lateral  Palpation:    Left knee tenderness present at:  Medial Joint Line and Patellar Lateral  Right knee tenderness present at:  Medial Joint Line and Patellar Lateral  Edema:  Edema of the knee: rt knee mid patella @ 1.5 cm larger.    Mobility Testing:  Not Assessed            Functional Testing:  : CHD with SLS, squat limited to @ 80 B, then flexed LB.  + with squat.                  General     ROS    Assessment/Plan:    Patient is a 35 year old female with both sides knee complaints.    Patient has the following significant findings with corresponding treatment plan.                Diagnosis 1:  B knee pain, lower extremity weakness  Pain -  self management, education, directional preference exercise and home program  Decreased ROM/flexibility - therapeutic exercise and home program  Decreased joint mobility - therapeutic exercise, therapeutic activity and home program  Decreased strength - therapeutic exercise, therapeutic activities and home program  Impaired balance - neuro re-education, therapeutic activities and home  program  Decreased function - therapeutic activities and home program    Therapy Evaluation Codes:   1) Clinical presentation characteristics are:   Stable/Uncomplicated.  2) Decision-Making    Low complexity using standardized patient assessment instrument and/or measureable assessment of functional outcome.  Cumulative Therapy Evaluation is: Low complexity.    Previous and current functional limitations:  (See Goal Flow Sheet for this information)    Short term and Long term goals: (See Goal Flow Sheet for this information)     Communication ability:  Patient appears to be able to clearly communicate and understand verbal and written communication and follow directions correctly.  Treatment Explanation - The following has been discussed with the patient:   RX ordered/plan of care  Anticipated outcomes  Possible risks and side effects  This patient would benefit from PT intervention to resume normal activities.   Rehab potential is fair.    Frequency:  2 X week, once daily  Duration:  for 6 weeks, total MD 12  Discharge Plan:  Achieve all LTG.  Independent in home treatment program.  Reach maximal therapeutic benefit.    Please refer to the daily flowsheet for treatment today, total treatment time and time spent performing 1:1 timed codes.

## 2020-09-22 ENCOUNTER — THERAPY VISIT (OUTPATIENT)
Dept: PHYSICAL THERAPY | Facility: CLINIC | Age: 35
End: 2020-09-22
Payer: COMMERCIAL

## 2020-09-22 DIAGNOSIS — G89.29 CHRONIC PAIN OF BOTH KNEES: ICD-10-CM

## 2020-09-22 DIAGNOSIS — M25.561 CHRONIC PAIN OF BOTH KNEES: ICD-10-CM

## 2020-09-22 DIAGNOSIS — M25.562 CHRONIC PAIN OF BOTH KNEES: ICD-10-CM

## 2020-09-22 PROCEDURE — 97110 THERAPEUTIC EXERCISES: CPT | Mod: GP | Performed by: PHYSICAL THERAPIST

## 2020-09-25 ENCOUNTER — THERAPY VISIT (OUTPATIENT)
Dept: PHYSICAL THERAPY | Facility: CLINIC | Age: 35
End: 2020-09-25
Payer: COMMERCIAL

## 2020-09-25 DIAGNOSIS — M25.561 CHRONIC PAIN OF BOTH KNEES: ICD-10-CM

## 2020-09-25 DIAGNOSIS — M25.562 CHRONIC PAIN OF BOTH KNEES: ICD-10-CM

## 2020-09-25 DIAGNOSIS — G89.29 CHRONIC PAIN OF BOTH KNEES: ICD-10-CM

## 2020-09-25 PROCEDURE — 97530 THERAPEUTIC ACTIVITIES: CPT | Mod: GP | Performed by: PHYSICAL THERAPIST

## 2020-09-25 PROCEDURE — 97110 THERAPEUTIC EXERCISES: CPT | Mod: GP | Performed by: PHYSICAL THERAPIST

## 2020-09-29 ENCOUNTER — THERAPY VISIT (OUTPATIENT)
Dept: PHYSICAL THERAPY | Facility: CLINIC | Age: 35
End: 2020-09-29
Payer: COMMERCIAL

## 2020-09-29 DIAGNOSIS — M25.562 CHRONIC PAIN OF BOTH KNEES: ICD-10-CM

## 2020-09-29 DIAGNOSIS — G89.29 CHRONIC PAIN OF BOTH KNEES: ICD-10-CM

## 2020-09-29 DIAGNOSIS — M25.561 CHRONIC PAIN OF BOTH KNEES: ICD-10-CM

## 2020-09-29 PROCEDURE — 97110 THERAPEUTIC EXERCISES: CPT | Mod: GP | Performed by: PHYSICAL THERAPIST

## 2020-09-29 PROCEDURE — 97530 THERAPEUTIC ACTIVITIES: CPT | Mod: GP | Performed by: PHYSICAL THERAPIST

## 2020-10-02 ENCOUNTER — THERAPY VISIT (OUTPATIENT)
Dept: PHYSICAL THERAPY | Facility: CLINIC | Age: 35
End: 2020-10-02
Payer: COMMERCIAL

## 2020-10-02 DIAGNOSIS — G89.29 CHRONIC PAIN OF BOTH KNEES: ICD-10-CM

## 2020-10-02 DIAGNOSIS — M25.562 CHRONIC PAIN OF BOTH KNEES: ICD-10-CM

## 2020-10-02 DIAGNOSIS — M25.561 CHRONIC PAIN OF BOTH KNEES: ICD-10-CM

## 2020-10-02 PROCEDURE — 97110 THERAPEUTIC EXERCISES: CPT | Mod: GP | Performed by: PHYSICAL THERAPIST

## 2020-10-02 PROCEDURE — 97530 THERAPEUTIC ACTIVITIES: CPT | Mod: GP | Performed by: PHYSICAL THERAPIST

## 2020-10-06 ENCOUNTER — THERAPY VISIT (OUTPATIENT)
Dept: PHYSICAL THERAPY | Facility: CLINIC | Age: 35
End: 2020-10-06
Payer: COMMERCIAL

## 2020-10-06 DIAGNOSIS — M25.562 CHRONIC PAIN OF BOTH KNEES: ICD-10-CM

## 2020-10-06 DIAGNOSIS — M25.561 CHRONIC PAIN OF BOTH KNEES: ICD-10-CM

## 2020-10-06 DIAGNOSIS — G89.29 CHRONIC PAIN OF BOTH KNEES: ICD-10-CM

## 2020-10-06 PROCEDURE — 97110 THERAPEUTIC EXERCISES: CPT | Mod: GP | Performed by: PHYSICAL THERAPIST

## 2020-10-06 PROCEDURE — 97530 THERAPEUTIC ACTIVITIES: CPT | Mod: GP | Performed by: PHYSICAL THERAPIST

## 2020-10-06 ASSESSMENT — ACTIVITIES OF DAILY LIVING (ADL)
STAND: ACTIVITY IS MINIMALLY DIFFICULT
PAIN: THE SYMPTOM AFFECTS MY ACTIVITY SLIGHTLY
RAW_SCORE: 50
WALK: ACTIVITY IS MINIMALLY DIFFICULT
SIT WITH YOUR KNEE BENT: ACTIVITY IS NOT DIFFICULT
STIFFNESS: THE SYMPTOM AFFECTS MY ACTIVITY SLIGHTLY
KNEEL ON THE FRONT OF YOUR KNEE: ACTIVITY IS VERY DIFFICULT
LIMPING: I DO NOT HAVE THE SYMPTOM
HOW_WOULD_YOU_RATE_THE_CURRENT_FUNCTION_OF_YOUR_KNEE_DURING_YOUR_USUAL_DAILY_ACTIVITIES_ON_A_SCALE_FROM_0_TO_100_WITH_100_BEING_YOUR_LEVEL_OF_KNEE_FUNCTION_PRIOR_TO_YOUR_INJURY_AND_0_BEING_THE_INABILITY_TO_PERFORM_ANY_OF_YOUR_USUAL_DAILY_ACTIVITIES?: 80
SQUAT: ACTIVITY IS FAIRLY DIFFICULT
HOW_WOULD_YOU_RATE_THE_OVERALL_FUNCTION_OF_YOUR_KNEE_DURING_YOUR_USUAL_DAILY_ACTIVITIES?: NEARLY NORMAL
GO UP STAIRS: ACTIVITY IS SOMEWHAT DIFFICULT
GIVING WAY, BUCKLING OR SHIFTING OF KNEE: I DO NOT HAVE THE SYMPTOM
AS_A_RESULT_OF_YOUR_KNEE_INJURY,_HOW_WOULD_YOU_RATE_YOUR_CURRENT_LEVEL_OF_DAILY_ACTIVITY?: NEARLY NORMAL
GO DOWN STAIRS: ACTIVITY IS SOMEWHAT DIFFICULT
WEAKNESS: I HAVE THE SYMPTOM BUT IT DOES NOT AFFECT MY ACTIVITY
SWELLING: I DO NOT HAVE THE SYMPTOM
KNEE_ACTIVITY_OF_DAILY_LIVING_SCORE: 71.43
KNEE_ACTIVITY_OF_DAILY_LIVING_SUM: 50
RISE FROM A CHAIR: ACTIVITY IS SOMEWHAT DIFFICULT

## 2020-11-29 ENCOUNTER — HEALTH MAINTENANCE LETTER (OUTPATIENT)
Age: 35
End: 2020-11-29

## 2021-04-07 PROBLEM — M25.561 PAIN IN BOTH KNEES: Status: RESOLVED | Noted: 2020-09-17 | Resolved: 2021-04-07

## 2021-04-07 PROBLEM — M25.562 PAIN IN BOTH KNEES: Status: RESOLVED | Noted: 2020-09-17 | Resolved: 2021-04-07

## 2021-04-07 NOTE — PROGRESS NOTES
"Discharge Note    Progress reporting period is from initial evaluation date (please see noted date below) to Oct 6, 2020.  Linked Episodes   Type: Episode: Status: Noted: Resolved: Last update: Updated by:   PHYSICAL THERAPY Knee pain9/17/2020 Active 9/17/2020  10/6/2020  2:31 PM Burke Do, PT      Comments:       Yamilex failed to follow up and current status is unknown.  Please see information below for last relevant information on current status.  Patient seen for 6 visits.    SUBJECTIVE  Subjective changes noted by patient:  Had 45\" PT session today.  Felt pretty good after.  Knees are better today.  Recalled she tried to run last week, run/walking.  Thinks this made her knees sore last week.    .  Current pain level is  .     Previous pain level was  4/10.   Changes in function:  Yes (See Goal flowsheet attached for changes in current functional level)  Adverse reaction to treatment or activity: None    OBJECTIVE  Changes noted in objective findings:       ASSESSMENT/PLAN  Diagnosis: B knee pain, OA   Updated problem list and treatment plan:   Decreased function - HEP  Decreased strength - HEP  Impaired balance - HEP  STG/LTGs have been met or progress has been made towards goals:  Yes, please see goal flowsheet for most current information  Assessment of Progress: current status is unknown.    Last current status:     Self Management Plans:  HEP  I have re-evaluated this patient and find that the nature, scope, duration and intensity of the therapy is appropriate for the medical condition of the patient.  Yamilex continues to require the following intervention to meet STG and LTG's:  HEP.    Recommendations:  Discharge with current home program.  Patient to follow up with MD as needed.    Please refer to the daily flowsheet for treatment today, total treatment time and time spent performing 1:1 timed codes.        "

## 2021-05-30 ENCOUNTER — RECORDS - HEALTHEAST (OUTPATIENT)
Dept: ADMINISTRATIVE | Facility: CLINIC | Age: 36
End: 2021-05-30

## 2021-09-25 ENCOUNTER — HEALTH MAINTENANCE LETTER (OUTPATIENT)
Age: 36
End: 2021-09-25

## 2022-01-15 ENCOUNTER — HEALTH MAINTENANCE LETTER (OUTPATIENT)
Age: 37
End: 2022-01-15

## 2022-12-26 ENCOUNTER — HEALTH MAINTENANCE LETTER (OUTPATIENT)
Age: 37
End: 2022-12-26

## 2023-04-16 ENCOUNTER — HEALTH MAINTENANCE LETTER (OUTPATIENT)
Age: 38
End: 2023-04-16

## (undated) DEVICE — GLOVE PROTEXIS POWDER FREE 6.5 ORTHOPEDIC 2D73ET65

## (undated) DEVICE — ENDO TROCAR FIRST ENTRY KII FIOS ADV FIX 05X100MM CFF03

## (undated) DEVICE — GLOVE PROTEXIS BLUE W/NEU-THERA 6.5  2D73EB65

## (undated) DEVICE — SOL NACL 0.9% IRRIG 3000ML BAG 2B7477

## (undated) DEVICE — SU VICRYL 4-0 PS-2 18" UND J496H

## (undated) DEVICE — BLADE KNIFE SURG 11 371111

## (undated) DEVICE — GLOVE PROTEXIS BLUE W/NEU-THERA 7.0  2D73EB70

## (undated) DEVICE — ENDO TROCAR BLUNT TIP KII BALLOON 12X100MM C0R47

## (undated) DEVICE — SUCTION CANISTER MEDIVAC LINER 3000ML W/LID 65651-530

## (undated) DEVICE — SYR 30ML LL W/O NDL 302832

## (undated) DEVICE — GLOVE SENSICARE MICRO PF 7.0 LATEX FREE MSG1670

## (undated) DEVICE — LINEN FULL SHEET 5511

## (undated) DEVICE — CATH CHOLANGIOGRAM KUMAR CC-019

## (undated) DEVICE — ESU GROUND PAD ADULT W/CORD E7507

## (undated) DEVICE — RAD RX ISOVUE 300 (50ML) 61% IOPAMIDOL CHARGE PER ML

## (undated) DEVICE — DRAPE C-ARM 60X42" 1013

## (undated) DEVICE — LINEN POUCH DBL 5427

## (undated) DEVICE — ADH SKIN CLOSURE PREMIERPRO EXOFIN MICOR HV 0.5ML 3471

## (undated) DEVICE — SU VICRYL 0 UR-6 27" J603H

## (undated) DEVICE — Device

## (undated) DEVICE — SUCTION IRR STRYKERFLOW II W/TIP 250-070-520

## (undated) DEVICE — ESU CORD MONOPOLAR 10'  E0510

## (undated) DEVICE — PREP CHLORAPREP 26ML TINTED ORANGE  260815

## (undated) DEVICE — DRAPE LEGGINGS 8421

## (undated) DEVICE — LINEN TOWEL PACK X10 5473

## (undated) DEVICE — ENDO POUCH UNIV RETRIEVAL SYSTEM INZII 10MM CD001

## (undated) DEVICE — LINEN HALF SHEET 5512

## (undated) DEVICE — SOL NACL 0.9% IRRIG 1000ML BOTTLE 2F7124

## (undated) DEVICE — CLIP APPLIER ENDO 5MM M/L LIGAMAX EL5ML

## (undated) DEVICE — GLOVE PROTEXIS POWDER FREE 7.5 ORTHOPEDIC 2D73ET75

## (undated) DEVICE — ENDO SPONGE ENDOSTICK KITTNER 5MM CHERRY 37002010

## (undated) DEVICE — BAG CLEAR TRASH 1.3M 39X33" P4040C

## (undated) DEVICE — GLOVE PROTEXIS BLUE W/NEU-THERA 8.0  2D73EB80

## (undated) DEVICE — SOL NACL 0.9% INJ 250ML BAG 2B1322Q

## (undated) DEVICE — GOWN LG DISP 9515

## (undated) DEVICE — DECANTER BAG 2002S

## (undated) DEVICE — SUCTION LAPAROSCOPIC SMOKE EVAC SYSTEM SEL7000A

## (undated) DEVICE — ENDO TROCAR SLEEVE KII Z-THREADED 05X100MM CTS02

## (undated) RX ORDER — GLYCOPYRROLATE 0.2 MG/ML
INJECTION INTRAMUSCULAR; INTRAVENOUS
Status: DISPENSED
Start: 2020-05-09

## (undated) RX ORDER — PHENYLEPHRINE HCL IN 0.9% NACL 1 MG/10 ML
SYRINGE (ML) INTRAVENOUS
Status: DISPENSED
Start: 2020-05-09

## (undated) RX ORDER — ONDANSETRON 2 MG/ML
INJECTION INTRAMUSCULAR; INTRAVENOUS
Status: DISPENSED
Start: 2020-05-09

## (undated) RX ORDER — DEXAMETHASONE SODIUM PHOSPHATE 4 MG/ML
INJECTION, SOLUTION INTRA-ARTICULAR; INTRALESIONAL; INTRAMUSCULAR; INTRAVENOUS; SOFT TISSUE
Status: DISPENSED
Start: 2020-05-09

## (undated) RX ORDER — LIDOCAINE HYDROCHLORIDE 10 MG/ML
INJECTION, SOLUTION EPIDURAL; INFILTRATION; INTRACAUDAL; PERINEURAL
Status: DISPENSED
Start: 2020-05-09

## (undated) RX ORDER — NEOSTIGMINE METHYLSULFATE 1 MG/ML
VIAL (ML) INJECTION
Status: DISPENSED
Start: 2020-05-09

## (undated) RX ORDER — EPHEDRINE SULFATE 50 MG/ML
INJECTION, SOLUTION INTRAMUSCULAR; INTRAVENOUS; SUBCUTANEOUS
Status: DISPENSED
Start: 2020-05-09

## (undated) RX ORDER — FENTANYL CITRATE 50 UG/ML
INJECTION, SOLUTION INTRAMUSCULAR; INTRAVENOUS
Status: DISPENSED
Start: 2020-05-09

## (undated) RX ORDER — ATROPINE SULFATE 0.4 MG/ML
AMPUL (ML) INJECTION
Status: DISPENSED
Start: 2020-05-09

## (undated) RX ORDER — CEFAZOLIN SODIUM IN 0.9 % NACL 3 G/100 ML
INTRAVENOUS SOLUTION, PIGGYBACK (ML) INTRAVENOUS
Status: DISPENSED
Start: 2020-05-09

## (undated) RX ORDER — HYDROCODONE BITARTRATE AND ACETAMINOPHEN 5; 325 MG/1; MG/1
TABLET ORAL
Status: DISPENSED
Start: 2020-05-09

## (undated) RX ORDER — PROPOFOL 10 MG/ML
INJECTION, EMULSION INTRAVENOUS
Status: DISPENSED
Start: 2020-05-09

## (undated) RX ORDER — BUPIVACAINE HYDROCHLORIDE 5 MG/ML
INJECTION, SOLUTION EPIDURAL; INTRACAUDAL
Status: DISPENSED
Start: 2020-05-09

## (undated) RX ORDER — HYDROMORPHONE HYDROCHLORIDE 1 MG/ML
INJECTION, SOLUTION INTRAMUSCULAR; INTRAVENOUS; SUBCUTANEOUS
Status: DISPENSED
Start: 2020-05-09